# Patient Record
Sex: FEMALE | Race: WHITE | NOT HISPANIC OR LATINO | Employment: UNEMPLOYED | URBAN - METROPOLITAN AREA
[De-identification: names, ages, dates, MRNs, and addresses within clinical notes are randomized per-mention and may not be internally consistent; named-entity substitution may affect disease eponyms.]

---

## 2018-07-30 ENCOUNTER — OFFICE VISIT - HEALTHEAST (OUTPATIENT)
Dept: FAMILY MEDICINE | Facility: CLINIC | Age: 33
End: 2018-07-30

## 2018-07-30 DIAGNOSIS — F15.10 METHAMPHETAMINE ABUSE (H): ICD-10-CM

## 2018-07-30 DIAGNOSIS — I95.9 LOW BLOOD PRESSURE: ICD-10-CM

## 2018-07-30 DIAGNOSIS — D17.30 LIPOMA OF SKIN AND SUBCUTANEOUS TISSUE: ICD-10-CM

## 2018-07-30 DIAGNOSIS — R53.83 FATIGUE: ICD-10-CM

## 2018-07-30 LAB
ALBUMIN SERPL-MCNC: 4 G/DL (ref 3.5–5)
ALP SERPL-CCNC: 52 U/L (ref 45–120)
ALT SERPL W P-5'-P-CCNC: 12 U/L (ref 0–45)
ANION GAP SERPL CALCULATED.3IONS-SCNC: 7 MMOL/L (ref 5–18)
AST SERPL W P-5'-P-CCNC: 16 U/L (ref 0–40)
BILIRUB SERPL-MCNC: 0.6 MG/DL (ref 0–1)
BUN SERPL-MCNC: 12 MG/DL (ref 8–22)
CALCIUM SERPL-MCNC: 9.3 MG/DL (ref 8.5–10.5)
CHLORIDE BLD-SCNC: 105 MMOL/L (ref 98–107)
CO2 SERPL-SCNC: 27 MMOL/L (ref 22–31)
CORTIS SERPL-MCNC: 5.2 UG/DL
CREAT SERPL-MCNC: 0.72 MG/DL (ref 0.6–1.1)
ERYTHROCYTE [DISTWIDTH] IN BLOOD BY AUTOMATED COUNT: 13.3 % (ref 11–14.5)
ERYTHROCYTE [SEDIMENTATION RATE] IN BLOOD BY WESTERGREN METHOD: 5 MM/HR (ref 0–20)
GFR SERPL CREATININE-BSD FRML MDRD: >60 ML/MIN/1.73M2
GLUCOSE BLD-MCNC: 73 MG/DL (ref 70–125)
HCT VFR BLD AUTO: 38 % (ref 35–47)
HGB BLD-MCNC: 13.1 G/DL (ref 12–16)
MCH RBC QN AUTO: 29.8 PG (ref 27–34)
MCHC RBC AUTO-ENTMCNC: 34.4 G/DL (ref 32–36)
MCV RBC AUTO: 87 FL (ref 80–100)
PLATELET # BLD AUTO: 285 THOU/UL (ref 140–440)
PMV BLD AUTO: 7.3 FL (ref 7–10)
POTASSIUM BLD-SCNC: 4.1 MMOL/L (ref 3.5–5)
PROT SERPL-MCNC: 6.5 G/DL (ref 6–8)
RBC # BLD AUTO: 4.38 MILL/UL (ref 3.8–5.4)
SODIUM SERPL-SCNC: 139 MMOL/L (ref 136–145)
TSH SERPL DL<=0.005 MIU/L-ACNC: 1.13 UIU/ML (ref 0.3–5)
WBC: 5.9 THOU/UL (ref 4–11)

## 2018-07-30 ASSESSMENT — MIFFLIN-ST. JEOR: SCORE: 1205.71

## 2018-08-01 ENCOUNTER — COMMUNICATION - HEALTHEAST (OUTPATIENT)
Dept: FAMILY MEDICINE | Facility: CLINIC | Age: 33
End: 2018-08-01

## 2021-05-26 ENCOUNTER — RECORDS - HEALTHEAST (OUTPATIENT)
Dept: ADMINISTRATIVE | Facility: CLINIC | Age: 36
End: 2021-05-26

## 2021-05-27 ENCOUNTER — RECORDS - HEALTHEAST (OUTPATIENT)
Dept: ADMINISTRATIVE | Facility: CLINIC | Age: 36
End: 2021-05-27

## 2021-05-28 ENCOUNTER — RECORDS - HEALTHEAST (OUTPATIENT)
Dept: ADMINISTRATIVE | Facility: CLINIC | Age: 36
End: 2021-05-28

## 2021-05-29 ENCOUNTER — RECORDS - HEALTHEAST (OUTPATIENT)
Dept: ADMINISTRATIVE | Facility: CLINIC | Age: 36
End: 2021-05-29

## 2021-06-01 VITALS — BODY MASS INDEX: 19.2 KG/M2 | HEIGHT: 65 IN | WEIGHT: 115.25 LBS

## 2021-06-19 NOTE — PROGRESS NOTES
Right arm lump  Super tired  Low bp    2 months tired.    Using drugs.  Clean 10 months.  Wt up 17 pounds.  Tired   No fever, no rash, nl stool urine,  No palpitations.   No shortness of breath  occ feel warmer.   ? Both ways    Neg fmh thyroid.    Eat meat.    Worried.    ROS: as noted above    OBJECTIVE:   Vitals:    07/30/18 1607   BP: (!) 84/50   Pulse:    Resp:    Temp:       Wt is noted.  No diaphoresis  Eyes: nl eom, anicteric   External ears, nose: nl    Neck: nl nodes, supple, thyroid normal     Right epitrochlear soft mobile flat lipoma consistency  Lungs: clear to ausc   Heart: regular rhythm  Abd: soft nontender     No cva (renal) tenderness  Neuro: no weakness  Skin no rash  Joints: uninflamed   No ketotic breath odor noted  Mental: euthymic  Ext: nontender calves   Gait: normal    Body mass index is 19.48 kg/(m^2).     ASSESSMENT/PLAN:    If neg labs, future am cortisol    follow up per labs or sx persistence pcp    1. Fatigue  HM2(CBC w/o Differential)    Comprehensive Metabolic Panel    Thyroid Stimulating Hormone (TSH)    Erythrocyte Sedimentation Rate    Cortisol    Cortisol   2. Lipoma of skin and subcutaneous tissue     3. Low blood pressure     4. Methamphetamine abuse

## 2021-09-22 ENCOUNTER — HOSPITAL ENCOUNTER (EMERGENCY)
Facility: CLINIC | Age: 36
Discharge: HOME | End: 2021-09-22
Attending: EMERGENCY MEDICINE | Admitting: EMERGENCY MEDICINE

## 2021-09-22 VITALS
TEMPERATURE: 97.7 F | WEIGHT: 110 LBS | DIASTOLIC BLOOD PRESSURE: 70 MMHG | OXYGEN SATURATION: 100 % | BODY MASS INDEX: 18.33 KG/M2 | HEART RATE: 73 BPM | SYSTOLIC BLOOD PRESSURE: 103 MMHG | HEIGHT: 65 IN | RESPIRATION RATE: 18 BRPM

## 2021-09-22 DIAGNOSIS — Z20.822 ENCOUNTER FOR LABORATORY TESTING FOR COVID-19 VIRUS: ICD-10-CM

## 2021-09-22 DIAGNOSIS — J00 ACUTE NASOPHARYNGITIS: ICD-10-CM

## 2021-09-22 PROBLEM — F15.10 METHAMPHETAMINE ABUSE (H): Status: ACTIVE | Noted: 2021-09-22

## 2021-09-22 LAB
DEPRECATED S PYO AG THROAT QL EIA: NEGATIVE
GROUP A STREP BY PCR: NOT DETECTED
SARS-COV-2 RNA RESP QL NAA+PROBE: NEGATIVE

## 2021-09-22 PROCEDURE — 87635 SARS-COV-2 COVID-19 AMP PRB: CPT | Performed by: EMERGENCY MEDICINE

## 2021-09-22 PROCEDURE — C9803 HOPD COVID-19 SPEC COLLECT: HCPCS

## 2021-09-22 PROCEDURE — 99283 EMERGENCY DEPT VISIT LOW MDM: CPT

## 2021-09-22 PROCEDURE — 87651 STREP A DNA AMP PROBE: CPT | Performed by: EMERGENCY MEDICINE

## 2021-09-22 ASSESSMENT — ENCOUNTER SYMPTOMS
DYSURIA: 0
NAUSEA: 0
NECK PAIN: 1
RHINORRHEA: 1
SORE THROAT: 1
MYALGIAS: 1
SLEEP DISTURBANCE: 1
DIARRHEA: 0
VOMITING: 0

## 2021-09-22 ASSESSMENT — MIFFLIN-ST. JEOR: SCORE: 1184.84

## 2021-09-22 NOTE — ED PROVIDER NOTES
EMERGENCY DEPARTMENT ENCOUNTER      NAME: Morelia Lemon  AGE: 36 year old female  YOB: 1985  MRN: [id number]  EVALUATION DATE & TIME: 2021 11:48 AM    PCP: No primary care provider on file.    ED PROVIDER: Amena Donovan M.D.      CHIEF COMPLAINT     Chief Complaint   Patient presents with     Covid Concern         FINAL IMPRESSION:     1. Encounter for laboratory testing for COVID-19 virus    2. Acute nasopharyngitis          MEDICAL DECISION MAKING:       Pertinent Labs & Imaging studies reviewed. (See chart for details)    36 year old female presents to the Emergency Department for evaluation of throat runny nose body aches subjective fever.    ED Course as of Sep 22 1548   Wed Sep 22, 2021   1545 Patient is here complaining of sore throat runny nose body aches.  She was a  yesterday and is concerned about Covid.  She is not vaccinated.      1545 Denies chest pain shortness of breath abdominal pain possibility of pain in the neck see no dysuria hematuria leg swelling or rashes.      1545 No one has told her that they tested positive for Covid she just wants to be tested and also has a sore throat.      1545 Exam she is nontoxic cooperative and pleasant on respiratory distress.      1546 Rhinorrhea, mild erythema but no evidence of tonsillar abscess no petechia no purpura moist mucous membranes neck is supple no lymphadenopathy.      1546 Covid negative.  Rapid strep negative.  Discussed with patient given that this is the first day of her symptoms she still need to quarantine and get tested again and always wear her mask.  Encouraged her to follow-up primary care doctor and return for any concerns.      1546 Felt comfortable to plan discharge ambulatory stable condition.            Differential Diagnosis (include but not limited to)  Jose of strep pharyngitis or other viral respiratory infection among others      Vital Signs: Reviewed  EKG: none  Imaging: none  Home Meds: reviewed  ED  meds/abx: none  Fluids: none    Labs  Covid negative  Strep negative        Review of Previous Records  Per chart review, the patient was see at  ED on 7/13 for abdominal and back pain.  The patient was instructed take Mylanta and Pepcid as prescribed for the abdominal pain. For her back pain, she was instructed to take Tylenol, apply ice packs to the area (20 minutes on, 20 minutes off), and use lidocaine patches (available over-the-counter at pharmacies) if she felt they are helpful. She was instructed to take the prescribed antibiotics for UTI and completely finish the prescription.    Consults      ED COURSE     12:30 PM I met with the patient and performed my initial exam.  I discussed the plan for care and the patient is agreeable with this plan. PPE: Provider wore gloves, N95 mask, eye protection, surgical cap, and paper mask.     I discussed the plan for discharge with the patient, and patient is agreeable. We discussed supportivecares at home and reasons for return to the ER including new or worsening symptoms - all questions and concerns addressed. Patient to be discharged by RN.      At the conclusion of the encounter I discussed the results of all of the tests and the disposition. The questions were answered. The patient acknowledged understanding and was agreeable with the care plan.           MEDICATIONS GIVEN IN THE EMERGENCY:   Medications - No data to display    NEW PRESCRIPTIONS STARTED AT TODAY'S ER VISIT     Discharge Medication List as of 9/22/2021 12:59 PM          =================================================================    HPI     Patient information was obtained from: the patient    Use of : N/A    Morelia Lemon is a 36 year old year old female with a pertinent medical history of UTI, methamphetamine use who presents to the ED by personal vehicle for the evaluation of COVID-19 concern.    Per chart review, the patient was see at  ED on 7/13 for abdominal and back  pain.      The patient reports she went to a  yesterday with no known COVID-19 contacts.  However, last night she began to have a sore throat, rhinorrhea, myalgias, neck pain, and had difficulty sleeping last night.  The patient is otherwise healthy.  The patient does not believe she is pregnant.     The patient denies nausea, vomiting, diarrhea, dysuria, and any other symptoms or complaints at this time.    REVIEW OF SYSTEMS   Review of Systems   HENT: Positive for rhinorrhea and sore throat.    Gastrointestinal: Negative for diarrhea, nausea and vomiting.   Genitourinary: Negative for dysuria.   Musculoskeletal: Positive for myalgias and neck pain.   Psychiatric/Behavioral: Positive for sleep disturbance.   All other systems reviewed and are negative.       PAST MEDICAL HISTORY:   No past medical history on file.    PAST SURGICAL HISTORY:     Past Surgical History:   Procedure Laterality Date     DILATION AND CURETTAGE N/A 2014    Procedure: DILATION AND CURETTAGE SUCTION;  Surgeon: Janes Apple MD;  Location: [facility];  Service:          CURRENT MEDICATIONS:   oxyCODONE-acetaminophen (PERCOCET) 5-325 mg per tablet         ALLERGIES:     Allergies   Allergen Reactions     Blood-Group Specific Substance Other (See Comments)     Patient has probable passive anti-D. Blood product orders may be delayed. Draw one red top and two purple top tubes for all Type and Screen/Type and crossmatch orders.     Decadron [Dexamethasone] Itching       FAMILY HISTORY:   No family history on file.    SOCIAL HISTORY:     Social History     Socioeconomic History     Marital status: Single     Spouse name: Not on file     Number of children: Not on file     Years of education: Not on file     Highest education level: Not on file   Occupational History     Not on file   Tobacco Use     Smoking status: Current Every Day Smoker     Packs/day: 0.50     Smokeless tobacco: Current User     Types: Chew, Chew      "Tobacco comment: Pt refused cessation information   Substance and Sexual Activity     Alcohol use: Yes     Comment: Alcoholic Drinks/day: Rare use, only special occasions.     Drug use: No     Sexual activity: Not on file   Other Topics Concern     Not on file   Social History Narrative     Not on file     Social Determinants of Health     Financial Resource Strain:      Difficulty of Paying Living Expenses:    Food Insecurity:      Worried About Running Out of Food in the Last Year:      Ran Out of Food in the Last Year:    Transportation Needs:      Lack of Transportation (Medical):      Lack of Transportation (Non-Medical):    Physical Activity:      Days of Exercise per Week:      Minutes of Exercise per Session:    Stress:      Feeling of Stress :    Social Connections:      Frequency of Communication with Friends and Family:      Frequency of Social Gatherings with Friends and Family:      Attends [employer] Services:      Active Member of Clubs or Organizations:      Attends Club or Organization Meetings:      Marital Status:    Intimate Partner Violence:      Fear of Current or Ex-Partner:      Emotionally Abused:      Physically Abused:      Sexually Abused:        VITALS:   /70   Pulse 73   Temp 97.7  F (36.5  C) (Oral)   Resp 18   Ht 1.651 m (5' 5\")   Wt 49.9 kg (110 lb)   SpO2 100%   BMI 18.30 kg/m      PHYSICAL EXAM     Physical Exam  Vitals and nursing note reviewed.   Constitutional:       Appearance: Normal appearance.   HENT:      Right Ear: Ear canal normal.      Left Ear: Ear canal normal.      Nose: Congestion and rhinorrhea present.      Mouth/Throat:     Cardiovascular:      Rate and Rhythm: Normal rate.      Pulses: Normal pulses.   Pulmonary:      Effort: Pulmonary effort is normal.      Breath sounds: Normal breath sounds.   Musculoskeletal:         General: Normal range of motion.   Skin:     General: Skin is warm.      Capillary Refill: Capillary refill takes less than 2 " seconds.   Neurological:      General: No focal deficit present.      Mental Status: She is alert and oriented to person, place, and time.   Psychiatric:         Mood and Affect: Mood normal.         Behavior: Behavior normal.         Physical Exam   Constitutional: non Toxic well-appearing cooperative and pleasant with exam    Head: Atraumatic.     Nose: Nose normal.     Mouth/Throat: Oropharynx is clear and moist.     Eyes: EOM are normal. Pupils are equal, round, and reactive to light.     Ears: External ears normal.     Neck: Normal range of motion. Neck supple.     Cardiovascular: Normal rate, regular rhythm and normal heart sounds.      Pulmonary/Chest: Normal effort  and breath sounds normal.     Abdominal: Soft. Bowel sounds are normal.    Musculoskeletal: Normal range of motion. No edema.    Neurological: No focal deficits    Skin: Skin is warm and dry.     Psychiatric: Normal mood and affect. Behavior is normal.       LAB:     All pertinent labs reviewed and interpreted.  Labs Ordered and Resulted from Time of ED Arrival Up to the Time of Departure from the ED   COVID-19 VIRUS (CORONAVIRUS) BY PCR - Normal    Narrative:     Testing was performed using the chidi  SARS-CoV-2 & Influenza A/B Assay on the chidi  Melissa  System.  This test should be ordered for the detection of SARS-COV-2 in individuals who meet SARS-CoV-2 clinical and/or epidemiological criteria. Test performance is unknown in asymptomatic patients.  This test is for in vitro diagnostic use under the FDA EUA for laboratories certified under CLIA to perform moderate and/or high complexity testing. This test has not been FDA cleared or approved.  A negative test does not rule out the presence of PCR inhibitors in the specimen or target RNA in concentration below the limit of detection for the assay. The possibility of a false negative should be considered if the patient's recent exposure or clinical presentation suggests COVID-19.  [facility]  [facility] are certified under the Clinical Laboratory Improvement Amendments of 1988 (CLIA-88) as qualified to perform moderate and/or high complexity laboratory testing.   STREPTOCOCCUS A RAPID SCREEN W REFELX TO PCR - Normal   GROUP A STREPTOCOCCUS PCR THROAT SWAB        RADIOLOGY:     Reviewed all pertinent imaging. Please see official radiology report.  No orders to display        EKG:       I have independently reviewed and interpreted the EKG(s) documented above.      PROCEDURES:     Procedures      I, Caesar Zavala, am serving as a scribe to document services personally performed by Dr. Donovan based on my observation and the provider's statements to me. I, Amena Donovan MD attest that Caesar Zavala is acting in a scribe capacity, has observed my performance of the services and has documented them in accordance with my direction.    Amena Donovan M.D.  Emergency Medicine  [facility] EMERGENCY ROOM  [address] [facility] [id number]  [phone]  Dept: [phone]     Amena Donovan MD  09/22/21 5061

## 2021-09-22 NOTE — ED TRIAGE NOTES
Presents with complaint of sore throat, subjective fever, generalized body aches, intermittent abdominal pain, and congestion that onset yesterday. Has not been vaccinated for covid.

## 2021-09-22 NOTE — Clinical Note
Morelia Lemon was seen and treated in our emergency department on 9/22/2021.  She may return to work on 09/23/2021.  Patient was seen in the emergency department today.     If you have any questions or concerns, please don't hesitate to call.      Amena Donovan MD

## 2021-09-22 NOTE — DISCHARGE INSTRUCTIONS
Read and follow the discharge instructions.    Covid test was negative but you do have symptoms concerning for Covid therefore quarantine always wear a mask and get rechecked.    We will call you only if your strep is positive.    Stay well-hydrated take lots of liquids    Tylenol as needed for pain    Follow-up with your primary care doctor or the clinic doctor provided    Turn for any concerns

## 2022-09-04 ENCOUNTER — HOSPITAL ENCOUNTER (EMERGENCY)
Facility: HOSPITAL | Age: 37
Discharge: HOME | End: 2022-09-04
Attending: EMERGENCY MEDICINE | Admitting: EMERGENCY MEDICINE
Payer: MEDICAID

## 2022-09-04 VITALS
SYSTOLIC BLOOD PRESSURE: 129 MMHG | RESPIRATION RATE: 20 BRPM | OXYGEN SATURATION: 100 % | TEMPERATURE: 98.9 F | DIASTOLIC BLOOD PRESSURE: 72 MMHG | WEIGHT: 109 LBS | HEIGHT: 65 IN | HEART RATE: 87 BPM | BODY MASS INDEX: 18.16 KG/M2

## 2022-09-04 DIAGNOSIS — J02.9 ACUTE PHARYNGITIS, UNSPECIFIED ETIOLOGY: ICD-10-CM

## 2022-09-04 LAB
ALBUMIN UR-MCNC: NEGATIVE MG/DL
APPEARANCE UR: CLEAR
BILIRUB UR QL STRIP: NEGATIVE
COLOR UR AUTO: ABNORMAL
DEPRECATED S PYO AG THROAT QL EIA: NEGATIVE
GLUCOSE UR STRIP-MCNC: NEGATIVE MG/DL
HGB UR QL STRIP: ABNORMAL
KETONES UR STRIP-MCNC: NEGATIVE MG/DL
LEUKOCYTE ESTERASE UR QL STRIP: NEGATIVE
MUCOUS THREADS #/AREA URNS LPF: PRESENT /LPF
NITRATE UR QL: NEGATIVE
PH UR STRIP: 6 [PH] (ref 5–7)
RBC URINE: 1 /HPF
SP GR UR STRIP: 1.02 (ref 1–1.03)
SQUAMOUS EPITHELIAL: 3 /HPF
UROBILINOGEN UR STRIP-MCNC: <2 MG/DL
WBC URINE: 0 /HPF

## 2022-09-04 PROCEDURE — 87651 STREP A DNA AMP PROBE: CPT | Performed by: EMERGENCY MEDICINE

## 2022-09-04 PROCEDURE — 81001 URINALYSIS AUTO W/SCOPE: CPT | Performed by: EMERGENCY MEDICINE

## 2022-09-04 PROCEDURE — 99283 EMERGENCY DEPT VISIT LOW MDM: CPT

## 2022-09-04 ASSESSMENT — ACTIVITIES OF DAILY LIVING (ADL): ADLS_ACUITY_SCORE: 33

## 2022-09-04 NOTE — ED TRIAGE NOTES
"amb to triage.  Pt states havingswelling to R side of neck. \"I think its my lymph nodes\"  Reports fever yesterday but today woke up feeling fine. Last took tylenol yesterday. Denies  Other sx.      Triage Assessment     Row Name 09/04/22 1816       Triage Assessment (Adult)    Airway WDL WDL       Respiratory WDL    Respiratory WDL WDL       Skin Circulation/Temperature WDL    Skin Circulation/Temperature WDL WDL       Cardiac WDL    Cardiac WDL WDL       Peripheral/Neurovascular WDL    Peripheral Neurovascular WDL WDL       Cognitive/Neuro/Behavioral WDL    Cognitive/Neuro/Behavioral WDL WDL              "

## 2022-09-04 NOTE — ED PROVIDER NOTES
"EMERGENCY DEPARTMENT NOTE     Name: Morelia Lemon    Age/Sex: 37 year old female   MRN: [id number]   Evaluation Date & Time:  9/4/2022  6:19 PM    PCP:    No Ref-Primary, Physician   ED Provider: Jamil Garay D.O.       CHIEF COMPLAINT    Swelling and Fever       DIAGNOSIS & DISPOSITION     1. Acute pharyngitis, unspecified etiology      DISPOSITION: Home    At the conclusion of the encounter I discussed the results of all of the tests and the disposition. The questions were answered. The patient or family acknowledged understanding and was agreeable with the care plan.    TOTAL CRITICAL CARE TIME (EXCLUDING PROCEDURES): Not applicable    PROCEDURES:   None    EMERGENCY DEPARTMENT COURSE/MEDICAL DECISION MAKING   7:00 PM I met with the patient to gather history and to perform my initial exam.  We discussed treatment options and the plan for care while in the Emergency Department.    8:05 PM Patient to be discharged. She is agreeable with this plan.    Morelia Lemon is a 37 year old female without significant past medical history who presents to the emergency department for evaluation of her throat and right-sided neck swelling  Triage note reviewed:  amb to triage.  Pt states havingswelling to R side of neck. \"I think its my lymph nodes\"  Reports fever yesterday but today woke up feeling fine. Last took tylenol yesterday. Denies  Other sx.      Triage Assessment     Row Name 09/04/22 1816       Triage Assessment (Adult)    Airway WDL WDL       Respiratory WDL    Respiratory WDL WDL       Skin Circulation/Temperature WDL    Skin Circulation/Temperature WDL WDL       Cardiac WDL    Cardiac WDL WDL       Peripheral/Neurovascular WDL    Peripheral Neurovascular WDL WDL       Cognitive/Neuro/Behavioral WDL    Cognitive/Neuro/Behavioral WDL WDL                Differential  diagnosis  considered included but not limited to peritonsillar abscess, deep space infection of the neck, malignancy  Vital signs:/72   " "Pulse 87   Temp 98.9  F (37.2  C) (Temporal)   Resp 20   Ht 1.651 m (5' 5\")   Wt 49.4 kg (109 lb)   LMP 2022 (Approximate)   SpO2 100%   BMI 18.14 kg/m    Pertinent physical exam findings:  HEENT: Bilateral tonsillar enlargement without asymmetric swelling exudate.  On the right side of her neck she has a 2 cm well-circumscribed is mobile mass consistent with enlarged lymph node.  No submandibular or sublingual swelling.  Dentition appear to be normal  Diagnostic studies:  Imaging:  No orders to display      Lab:  Labs Ordered and Resulted from Time of ED Arrival to Time of ED Departure - No data to display   Interventions:  Medical decision makin:54 PM patient currently wishes to be discharged from the emergency department.  Rapid strep and urinalysis are pending and I am waiting call the patient with the results.  Patient has had 3-day history of sore throat and was concerned about swelling along the right side of her neck.  HEENT exam revealed bilateral tonsillar enlargement with symmetric without exudates and did not appear to have peritonsillar abscess.  Her dentition was normal without dental carry or gingival swelling.  There is no buccal cellulitis or submandibular swelling.  On her neck exam she had a discrete 1 to 2 cm rounded minimally tender mass on the right side of his neck which was consistent with an enlarged lymph node.  Patient had no other lymphadenopathy on exam.   I discussed that this may represent a response to a viral infection with the pharyngitis with the patient and with have checked a strep test which is pending.  Patient's only other complaint was intermittent kidney pain without specific urinary symptoms but after discussion also checked urinalysis.  Patient will be discharged with symptomatic care for pharyngitis with Tylenol ibuprofen for pain, salt water gargles.  I will call her if there appears to be strep or urinary tract infection will initiate " antibiotics.  Patient does not have a primary care clinic we will also give her referral to establish primary care.  Return criteria were discussed and if patient has progressive symptoms including increased swelling on the side of her neck or progressive sore throat with asymmetric swelling of her tonsillitis or significant dysphagia will return to the emergency department.  Note: Rapid strep was negative and urinalysis without evidence of infection  ED INTERVENTIONS   Medications - No data to display    DISCHARGE MEDICATIONS        Review of your medicines      UNREVIEWED medicines. Ask your doctor about these medicines      Dose / Directions   oxyCODONE-acetaminophen 5-325 MG tablet  Commonly known as: PERCOCET      [OXYCODONE-ACETAMINOPHEN (PERCOCET) 5-325 MG PER TABLET] 1-2 tabs by mouth every 4-6 hours as needed for pain  Quantity: 15 tablet  Refills: 0              INFORMATION SOURCE AND LIMITATIONS    History/Exam limitations: None  Patient information was obtained from: the patient  Use of : N/A    HISTORY OF PRESENT ILLNESS   Morelia Lemon is a 37 year old year old female with a relevant past history of depression, UTI, methamphetamine abuse, who presents to this ED for evaluation of swollen lymph nodes.    The patient describes a swelling on the right upper side of the neck for the past three days. The patient also reports an associated difficulty in swallowing due to pain. She reports that she had a fever one day, but that this has resolved. She denies swelling anywhere else on the body, cough, cold, stomach pain, diarrhea, and dysuria. The patient also reports a kidney pain, but thinks this is because she drinks a lot of alcohol, and reports this to be unrelated to her chief complaint. She denies any history of kidney problems.    REVIEW OF SYSTEMS:   Constitutional: Positive for fever (resolved).   HENT: Negative for URI symptoms or sore throat.  Positive for trouble swallowing and neck  "swelling.   Cardiac: Negative for chest pain, palpitations, near syncope or syncope  Respiratory: Negative for cough and shortness of breath.    Gastrointestinal: Negative for abdominal pain, nausea, vomiting, constipation, diarrhea, rectal bleeding or melena.  Genitourinary: Negative for dysuria, flank pain and hematuria. Positive for \"kidney pain\"  Musculoskeletal: Negative for back pain.   Skin: Negative for  rash  Neurological: Negative for dizziness, headache, syncope, speech difficulty, unilateral weakness or imbalance with walking.   Hematological: Negative for adenopathy. Does not bruise/bleed easily.   Psychiatric/Behavioral: Negative for confusion.       PATIENT HISTORY   No past medical history on file.  Patient Active Problem List   Diagnosis     Lower Back Pain     Dysfunctional Uterine Bleeding     Urinary Tract Infection     Depression     Amenorrhea     Methamphetamine Abuse     Pregnancy     Retained products of conception with hemorrhage     Methamphetamine abuse (H)     Vitamin D deficiency     Past Surgical History:   Procedure Laterality Date     DILATION AND CURETTAGE N/A 11/30/2014    Procedure: DILATION AND CURETTAGE SUCTION;  Surgeon: Janes Apple MD;  Location: [facility];  Service:      ONTRAPORT [facility]  Smoking: The patient smokes cigarettes regularly.   Alcohol Use: The patient reports drinking heavily.  Allergies   Allergen Reactions     Blood-Group Specific Substance Other (See Comments)     Patient has probable passive anti-D. Blood product orders may be delayed. Draw one red top and two purple top tubes for all Type and Screen/Type and crossmatch orders.     Decadron [Dexamethasone] Itching         OUTPATIENT MEDICATIONS     Discharge Medication List as of 9/4/2022  8:02 PM         Vitals:    09/04/22 1900 09/04/22 1915 09/04/22 1930 09/04/22 2006   BP: 118/77 112/79 (!) 108/92 129/72   Pulse: 89 86 89 87   Resp:    20   Temp:       TempSrc:       SpO2: 98% 99% 99% 100% "   Weight:       Height:           Physical Exam   Constitutional: Oriented to person, place, and time. Appears well-developed and well-nourished.   HEENT:    Bilateral tonsillar enlargement without asymmetric swelling exudate.  On the right side of her neck she has a 2 cm well-circumscribed is mobile mass consistent with enlarged lymph node.  No submandibular or sublingual swelling.  Dentition appear to be normal  Cardiovascular: Normal rate, regular rhythm and normal heart sounds.    Pulmonary/Chest: Normal effort  and breath sounds normal.   Abdominal: Soft. Bowel sounds are normal.   Musculoskeletal: Normal range of motion.   Neurological: Alert and oriented to person, place, and time. Normal strength.No sensory deficit. No cranial nerve deficit . Skin: Skin is warm and dry.   Psychiatric: Normal mood and affect. Behavior is normal. Thought content normal.       DIAGNOSTICS    LABORATORY FINDINGS (REVIEWED AND INTERPRETED):  Labs Ordered and Resulted from Time of ED Arrival to Time of ED Departure - No data to display      IMAGING (REVIEWED AND INTERPRETED):  No orders to display           I, Henrietta Samuel, am serving as a scribe to document services personally performed by Jamil Garay D.O., based on my observation and the provider s statements to me.    IJamil D.O., attest that Henrietta Samuel is acting in a scribe capacity, has observed my performance of the services and has documented them in accordance with my direction.    Jamil Garay D.O.  EMERGENCY MEDICINE   09/04/22  [facility] EMERGENCY DEPARTMENT  [address] [place] [id number]  [phone]  Dept: [phone]     Jamil Garay DO  09/06/22 0214

## 2022-09-05 LAB — GROUP A STREP BY PCR: DETECTED

## 2022-09-05 NOTE — DISCHARGE INSTRUCTIONS
Take ibuprofen 600 mg every 8 hours and Tylenol 1 g every 8 hours for pain management.  Gargle with salt water and maintain hydration by drinking a liter per day.  If worsening sore throat, you have swelling of one tonsil greater than the other, increased neck swelling or recurrent fever return to the emergency department.  You have been given referral to primary care to follow-up within 1 week and will receive a call to schedule.

## 2022-10-18 ENCOUNTER — TELEPHONE (OUTPATIENT)
Dept: FAMILY MEDICINE | Facility: CLINIC | Age: 37
End: 2022-10-18

## 2022-10-18 NOTE — TELEPHONE ENCOUNTER
Reason for call:  Other   Patient called regarding (reason for call): appointment  Additional comments:   Patient requesting lump on RT elbow, cannot do anything with RT arm, cannot lift and it is painful.   Patient seeking any avail provider [place], [facility], [facility].  She is hoping for an appt between 10am-230 if possible.    Phone number to reach patient:  Other phone number:    [phone] Carmenza Whyte      Best Time:  any    Can we leave a detailed message on this number?  YES    Travel screening: Not Applicable

## 2022-10-19 NOTE — TELEPHONE ENCOUNTER
Called patient to follow-up with phone call from yesterday.  States that she has been having ongoing right arm pain for 3 weeks. Advised patient to go to [facility] for evaluation due to no openings in clinic this week & next week. Patient refused saying that she can wait to see a provider in clinic & rather not go to the [facility] since she has been able to bear the pain for a few weeks now. Transferred patient to central scheduling to assist with scheduling with other clinics.    Delfino Chance, RN, BSN  [facility]

## 2022-12-01 ENCOUNTER — OFFICE VISIT (OUTPATIENT)
Dept: FAMILY MEDICINE | Facility: CLINIC | Age: 37
End: 2022-12-01
Payer: COMMERCIAL

## 2022-12-01 VITALS
SYSTOLIC BLOOD PRESSURE: 130 MMHG | RESPIRATION RATE: 20 BRPM | OXYGEN SATURATION: 99 % | DIASTOLIC BLOOD PRESSURE: 80 MMHG | HEART RATE: 65 BPM | TEMPERATURE: 97.6 F | WEIGHT: 109.25 LBS | HEIGHT: 66 IN | BODY MASS INDEX: 17.56 KG/M2

## 2022-12-01 DIAGNOSIS — N63.21 MASS OF UPPER OUTER QUADRANT OF LEFT BREAST: ICD-10-CM

## 2022-12-01 DIAGNOSIS — Z98.51 HISTORY OF TUBAL LIGATION: ICD-10-CM

## 2022-12-01 DIAGNOSIS — M77.11 RIGHT LATERAL EPICONDYLITIS: ICD-10-CM

## 2022-12-01 DIAGNOSIS — F41.9 ANXIETY: Primary | ICD-10-CM

## 2022-12-01 PROCEDURE — 99204 OFFICE O/P NEW MOD 45 MIN: CPT | Performed by: FAMILY MEDICINE

## 2022-12-01 RX ORDER — ESCITALOPRAM OXALATE 10 MG/1
10 TABLET ORAL DAILY
Qty: 30 TABLET | Refills: 11 | Status: SHIPPED | OUTPATIENT
Start: 2022-12-01 | End: 2024-06-29

## 2022-12-01 RX ORDER — NAPROXEN 500 MG/1
500 TABLET ORAL 2 TIMES DAILY WITH MEALS
Qty: 60 TABLET | Refills: 1 | Status: SHIPPED | OUTPATIENT
Start: 2022-12-01 | End: 2024-06-29

## 2022-12-01 NOTE — PROGRESS NOTES
ASSESMENT AND PLAN:  Diagnoses and all orders for this visit:  Anxiety  Counseling done today with the patient in detail.  We discussed formal counseling and psychotherapy, patient declines that.  We discussed healthy lifestyle issues and interventions that she can make to help and she will work on this, we discussed reduction or cessation of alcohol intake.  We reviewed medications and she would like to proceed with medication as prescribed  below, follow-up with me in the clinic in 6 weeks for routine recheck, sooner if problems.  -     escitalopram (LEXAPRO) 10 MG tablet; Take 1 tablet (10 mg) by mouth daily  History of tubal ligation  Updated chart.  Right lateral epicondylitis  Patient will get a tennis elbow strap brace and start using that, naproxen as prescribed below, if in her 6-week follow-up as detailed above she is still having significant problems at that time would consider steroid injection.  -     naproxen (NAPROSYN) 500 MG tablet; Take 1 tablet (500 mg) by mouth 2 times daily (with meals)  Mass of upper outer quadrant of left breast  On exam it feels like just a prominent area of tender breast tissue but will need imaging to further evaluate.  Patient in agreement with the plan.  -     MA Diagnostic Digital Bilateral; Future  -     US Breast Left Limited 1-3 Quadrants; Future      Reviewed the risks and benefits of the treatment plan with the patient and/or caregiver and we discussed indications for routine and emergent follow-up.        SUBJECTIVE: 37-year-old female new patient to me, I have been the physician for some of her family members.  Patient has several concerns.  Her chief concern is that about 4 to 5 weeks ago she noticed a area of mild pain and tenderness in her left lateral breast/axilla area.  When she feels the area she feels what feels like a lump there.  She thinks it may have increased in size over this past month.    Patient also reports moderate pain in the right elbow,  "sometimes severe.  Worsens with some movements of the arm and hand.  She has not been taking any medication for her, she has tolerated NSAIDs well in the past.  She is left-handed.  Patient is not currently employed and cannot think of any repetitive or heavy activities with the hand and arm that she has been involved in recently.    Patient also reports that she has been struggling with increasing anxiety over this past year.  She feels like multiple times per day she feels overwhelmed by anxiety.  Sometimes this feeling is associated with a tightness in her breathing that lasts for several minutes.  She has been self-medicating with 3 shots of alcohol per day on most days.  Patient has remote history of methamphetamine abuse but has not used methamphetamine at all recently.        No past medical history on file.  Patient Active Problem List   Diagnosis     Lower Back Pain     Dysfunctional Uterine Bleeding     Urinary Tract Infection     Depression     Amenorrhea     Methamphetamine Abuse     Pregnancy     Retained products of conception with hemorrhage     Methamphetamine abuse (H)     Vitamin D deficiency     History of tubal ligation     Current Outpatient Medications   Medication Sig Dispense Refill     escitalopram (LEXAPRO) 10 MG tablet Take 1 tablet (10 mg) by mouth daily 30 tablet 11     naproxen (NAPROSYN) 500 MG tablet Take 1 tablet (500 mg) by mouth 2 times daily (with meals) 60 tablet 1     History   Smoking Status     Every Day     Packs/day: 0.50   Smokeless Tobacco     Current     Types: Chew, Chew     Comment: Pt refused cessation information       OBJECTICE: /80 (BP Location: Right arm, Patient Position: Sitting, Cuff Size: Adult Small)   Pulse 65   Temp 97.6  F (36.4  C) (Oral)   Resp 20   Ht 1.677 m (5' 6.04\")   Wt 49.6 kg (109 lb 4 oz)   LMP 11/30/2022 (Exact Date)   SpO2 99%   BMI 17.61 kg/m       No results found for this or any previous visit (from the past 24 " hour(s)).     GEN-alert, appropriate, in no apparent distress   Breast exam- done with Wendie present in the room for the exam- bilateral breast exam shows no dominant mass.  In her area of concern in the upper outer quadrant and moving into the axilla of the left breast there is an area that she identifies that is tender to palpation and per my exam feels like an area of prominent breast tissue.  It is of rubbery consistency and is mobile.   CV-regular rate and rhythm   RESP-lungs clear to auscultation   Musculoskeletal-she is point tender to palpation and it repeats her pain complaints with palpation of the right elbow lateral epicondyles and proximal tendon. SKIN-no abnormalities of the skin or nipple of either breast      Jose Glover MD

## 2023-03-07 ENCOUNTER — DOCUMENTATION ONLY (OUTPATIENT)
Dept: FAMILY MEDICINE | Facility: CLINIC | Age: 38
End: 2023-03-07
Payer: COMMERCIAL

## 2023-03-07 NOTE — PROGRESS NOTES
Unable to reach patient over the telephone, voice message left on the cell phone.  Letter sent:    March 7, 2023      Morelia Lemon  [address] [place] [address]        Dear Morelia,     I noticed that you missed 2 appointments in January and I do not see that they have been rescheduled.  One appointment was for breast imaging which I think is very important that it be completed, please call the breast center or the [facility] number to reschedule this.  You also missed your follow-up appointment with me to follow-up on your anxiety and the medication that I prescribed.  Please schedule follow-up appoint with me at your earliest convenience or I am also open to just having a follow-up discussion over the telephone if you could give me a call here at the clinic, I was unable to reach you over the phone on several occasions.      Sincerely,        Jose Glover MD

## 2023-05-27 ENCOUNTER — HOSPITAL ENCOUNTER (EMERGENCY)
Facility: HOSPITAL | Age: 38
Discharge: HOME | End: 2023-05-27
Attending: EMERGENCY MEDICINE | Admitting: EMERGENCY MEDICINE
Payer: COMMERCIAL

## 2023-05-27 VITALS
DIASTOLIC BLOOD PRESSURE: 70 MMHG | BODY MASS INDEX: 17.73 KG/M2 | SYSTOLIC BLOOD PRESSURE: 109 MMHG | OXYGEN SATURATION: 98 % | HEART RATE: 108 BPM | TEMPERATURE: 98.4 F | WEIGHT: 110 LBS | RESPIRATION RATE: 17 BRPM

## 2023-05-27 DIAGNOSIS — J02.0 STREPTOCOCCAL SORE THROAT: ICD-10-CM

## 2023-05-27 LAB
FLUAV RNA SPEC QL NAA+PROBE: NEGATIVE
FLUBV RNA RESP QL NAA+PROBE: NEGATIVE
GROUP A STREP BY PCR: DETECTED
RSV RNA SPEC NAA+PROBE: NEGATIVE
SARS-COV-2 RNA RESP QL NAA+PROBE: NEGATIVE

## 2023-05-27 PROCEDURE — 250N000011 HC RX IP 250 OP 636: Performed by: EMERGENCY MEDICINE

## 2023-05-27 PROCEDURE — 87637 SARSCOV2&INF A&B&RSV AMP PRB: CPT | Performed by: EMERGENCY MEDICINE

## 2023-05-27 PROCEDURE — C9803 HOPD COVID-19 SPEC COLLECT: HCPCS

## 2023-05-27 PROCEDURE — 250N000013 HC RX MED GY IP 250 OP 250 PS 637: Performed by: EMERGENCY MEDICINE

## 2023-05-27 PROCEDURE — 96372 THER/PROPH/DIAG INJ SC/IM: CPT | Performed by: EMERGENCY MEDICINE

## 2023-05-27 PROCEDURE — 99284 EMERGENCY DEPT VISIT MOD MDM: CPT

## 2023-05-27 PROCEDURE — 87651 STREP A DNA AMP PROBE: CPT | Performed by: EMERGENCY MEDICINE

## 2023-05-27 RX ORDER — IBUPROFEN 600 MG/1
600 TABLET, FILM COATED ORAL ONCE
Status: COMPLETED | OUTPATIENT
Start: 2023-05-27 | End: 2023-05-27

## 2023-05-27 RX ORDER — IBUPROFEN 800 MG/1
800 TABLET, FILM COATED ORAL EVERY 8 HOURS PRN
Qty: 24 TABLET | Refills: 0 | Status: SHIPPED | OUTPATIENT
Start: 2023-05-27 | End: 2023-06-04

## 2023-05-27 RX ADMIN — IBUPROFEN 600 MG: 600 TABLET, FILM COATED ORAL at 13:58

## 2023-05-27 RX ADMIN — PENICILLIN G BENZATHINE 1.2 MILLION UNITS: 1200000 INJECTION, SUSPENSION INTRAMUSCULAR at 15:05

## 2023-05-27 ASSESSMENT — ACTIVITIES OF DAILY LIVING (ADL): ADLS_ACUITY_SCORE: 35

## 2023-05-27 NOTE — ED PROVIDER NOTES
EMERGENCY DEPARTMENT ENCOUNTER      NAME: Morelia Lemon  AGE: 37 year old female  YOB: 1985  MRN: [id number]  EVALUATION DATE & TIME: 5/27/2023  1:49 PM    PCP: Jose Glover    ED PROVIDER: Alysia Looney M.D.      Chief Complaint   Patient presents with     Otalgia     Throat Pain         FINAL IMPRESSION:  1. Streptococcal sore throat          ED COURSE & MEDICAL DECISION MAKING:    ED Course as of 05/27/23 1514   Sat May 27, 2023   1354 I met the patient and performed my initial interview and exam.      1357 Pt with gradual onset 12 hours right ear pressure and sore throat, no medications taken yet, given ibuprofen here in ED and COVID19/influenza PCR and strep PCR sent from the ED with reassuring examination.    1451 Pt strep + thus will offer IM benzathine PCN vs. Oral antibiotic therapy with strep throat   1458 I spoke with patient and offered oral antibiotic therapy in the form of pill Rx vs. Single IM injection of bicillin, she requests bicillin injection and dose ordered here in ED with Rx to preferred pharmacy for NSAID therapy also. Patient discharged after being provided with extensive anticipatory guidance and given return precautions, importance of PMD follow-up emphasized.        Pertinent Labs & Imaging studies reviewed. (See chart for details)    N95 worn  A face shield was worn also  COVID PPE    Medical Decision Making    History:    Supplemental history from: Documented in chart, if applicable    External Record(s) reviewed: Documented in chart, if applicable.    Work Up:    Chart documentation includes differential considered and any EKGs or imaging independently interpreted by provider, where specified.    In additional to work up documented, I considered the following work up: Documented in chart, if applicable.    External consultation:    Discussion of management with another provider: Documented in chart, if applicable    Complicating factors:    Care impacted by chronic  illness: N/A    Care affected by social determinants of health: Alcohol Abuse and/or Recreational Drug Use    Disposition considerations: Discharge. I prescribed additional prescription strength medication(s) as charted. See documentation for any additional details.    At the conclusion of the encounter I discussed the results of all of the tests and the disposition. The questions were answered. The patient or family acknowledged understanding and was agreeable with the care plan.     MEDICATIONS GIVEN IN THE EMERGENCY:  Medications   ibuprofen (ADVIL/MOTRIN) tablet 600 mg (600 mg Oral $Given 5/27/23 6269)   penicillin G benzathine (BICILLIN L-A) injection 1.2 Million Units (1.2 Million Units Intramuscular $Given 5/27/23 1505)       NEW PRESCRIPTIONS STARTED AT TODAY'S ER VISIT  New Prescriptions    IBUPROFEN (ADVIL/MOTRIN) 800 MG TABLET    Take 1 tablet (800 mg) by mouth every 8 hours as needed for moderate pain          =================================================================    HPI      Morelia Lemon is a 37 year old female with PMHx of methamphetamine abuse who presents to the ED today via walk in with sore throat    Gradual onset since yesterday evening 8/10 constant sore throat with assoicated sence of lymph node enlargement and right ear ache. No cough. No known sick contacts. No fever. No medications prior to arrival. She is brought in with her mother.    REVIEW OF SYSTEMS   All other systems reviewed and are negative except as noted above in HPI.    PAST MEDICAL HISTORY:  History reviewed. No pertinent past medical history.    PAST SURGICAL HISTORY:  Past Surgical History:   Procedure Laterality Date     DILATION AND CURETTAGE N/A 11/30/2014    Procedure: DILATION AND CURETTAGE SUCTION;  Surgeon: Janes Apple MD;  Location: [facility];  Service:        CURRENT MEDICATIONS:    ibuprofen (ADVIL/MOTRIN) 800 MG tablet  escitalopram (LEXAPRO) 10 MG tablet  naproxen (NAPROSYN) 500 MG  tablet        ALLERGIES:  Allergies   Allergen Reactions     Blood-Group Specific Substance Other (See Comments)     Patient has probable passive anti-D. Blood product orders may be delayed. Draw one red top and two purple top tubes for all Type and Screen/Type and crossmatch orders.     Decadron [Dexamethasone] Itching       FAMILY HISTORY:  History reviewed. No pertinent family history.    SOCIAL HISTORY:   Social History     Socioeconomic History     Marital status: Single   Tobacco Use     Smoking status: Every Day     Packs/day: 0.50     Types: Cigarettes     Smokeless tobacco: Current     Types: Chew     Tobacco comments:     Pt refused cessation information   Substance and Sexual Activity     Alcohol use: Yes     Comment: Alcoholic Drinks/day: Rare use, only special occasions.     Drug use: No       VITALS:  Patient Vitals for the past 24 hrs:   BP Temp Temp src Pulse Resp SpO2 Weight   05/27/23 1346 109/70 98.4  F (36.9  C) Oral 108 17 98 % 49.9 kg (110 lb)       PHYSICAL EXAM    GENERAL: Awake, alert.  In no acute distress.   HEENT: Normocephalic, atraumatic.  Pupils equal, round and reactive.  Conjunctiva normal.  EOMI. Bilateral TMs without effusion, redness, or laying fluid, Oral pharynx normal, voice normal, swallowing saliva  NECK: No stridor or apparent deformity.  PULMONARY: Symmetrical breath sounds without distress.  Lungs clear to auscultation bilaterally without wheezes, rhonchi or rales.  CARDIO: Regular rate and rhythm.  No significant murmur, rub or gallop.  Radial pulses strong and symmetrical.  ABDOMINAL: Abdomen soft, non-distended and non-tender to palpation.  No CVAT, no palpable hepatosplenomegaly.  EXTREMITIES: No lower extremity swelling or edema.    NEURO: Alert and oriented to person, place and time.  Cranial nerves grossly intact.  No focal motor deficit.  PSYCH: Normal mood and affect  SKIN: No rashes      LAB:  All pertinent labs reviewed and interpreted.  Results for orders  placed or performed during the hospital encounter of 05/27/23   Symptomatic Influenza A/B, RSV, & SARS-CoV2 PCR (COVID-19) Nasopharyngeal    Specimen: Nasopharyngeal; Swab   Result Value Ref Range    Influenza A PCR Negative Negative    Influenza B PCR Negative Negative    RSV PCR Negative Negative    SARS CoV2 PCR Negative Negative   Group A Streptococcus PCR Throat Swab    Specimen: Throat; Swab   Result Value Ref Range    Group A strep by PCR Detected (A) Not Detected               I, Pat Kietsheila, am serving as a scribe to document services personally performed by Dr. Alysia Looney based on my observation and the provider's statements to me. I, Alysia Looney MD attest that Pat Sweet is acting in a scribe capacity, has observed my performance of the services and has documented them in accordance with my direction.     Alysia Looney MD  05/27/23 1515

## 2023-05-27 NOTE — ED NOTES
Pt states her throat started to hurt last night. No meds taken today tp help with the pain. Rates throat pain 8/10.

## 2023-05-27 NOTE — ED TRIAGE NOTES
Pt presents to triage ambulatory for sore throat. Pt reports swollen lymph node on R side, sore throat, and R ear pain started yesterday. Has not been taking her temp but reports chills.

## 2023-05-27 NOTE — Clinical Note
Morelia Lemon was seen and treated in our emergency department on 5/27/2023.  She may return to work on 05/28/2023.       If you have any questions or concerns, please don't hesitate to call.      Alysia Looney MD

## 2023-10-03 ENCOUNTER — TELEPHONE (OUTPATIENT)
Dept: FAMILY MEDICINE | Facility: CLINIC | Age: 38
End: 2023-10-03

## 2023-10-03 NOTE — TELEPHONE ENCOUNTER
Patient Quality Outreach    Patient is due for the following:   Cervical Cancer Screening - PAP Needed    Next Steps:   Schedule a Adult Preventative    Type of outreach:    Phone, VM is not set up.       Questions for provider review:    None           Carmela Long MA

## 2023-10-16 NOTE — TELEPHONE ENCOUNTER
Patient Quality Outreach    Patient is due for the following:   Cervical Cancer Screening - PAP Needed  Physical Preventive Adult Physical    Next Steps:   Schedule a Adult Preventative    Type of outreach:    Phone, VM not set up.       Questions for provider review:    None           Carmela Long MA

## 2023-11-15 NOTE — TELEPHONE ENCOUNTER
Patient Quality Outreach    Patient is due for the following:   Cervical Cancer Screening - PAP Needed  Physical Preventive Adult Physical    Next Steps:   Schedule a Adult Preventative    Type of outreach:    Phone, not available     Next Steps:  Reach out within 90 days via Phone.    Max number of attempts reached: Yes. Will try again in 90 days if patient still on fail list.    Questions for provider review:    None           Carmela Long MA

## 2024-06-29 ENCOUNTER — HOSPITAL ENCOUNTER (INPATIENT)
Facility: HOSPITAL | Age: 39
LOS: 1 days | Discharge: LEFT BEFORE DISCHARGE | End: 2024-06-29
Attending: EMERGENCY MEDICINE | Admitting: FAMILY MEDICINE
Payer: MEDICAID

## 2024-06-29 VITALS
HEIGHT: 65 IN | DIASTOLIC BLOOD PRESSURE: 55 MMHG | RESPIRATION RATE: 20 BRPM | OXYGEN SATURATION: 98 % | TEMPERATURE: 97.7 F | SYSTOLIC BLOOD PRESSURE: 90 MMHG | BODY MASS INDEX: 19.36 KG/M2 | HEART RATE: 66 BPM | WEIGHT: 116.2 LBS

## 2024-06-29 DIAGNOSIS — R11.0 NAUSEA: ICD-10-CM

## 2024-06-29 DIAGNOSIS — R50.9 FEVER, UNSPECIFIED FEVER CAUSE: ICD-10-CM

## 2024-06-29 DIAGNOSIS — R51.9 ACUTE NONINTRACTABLE HEADACHE, UNSPECIFIED HEADACHE TYPE: ICD-10-CM

## 2024-06-29 DIAGNOSIS — R52 BODY ACHES: ICD-10-CM

## 2024-06-29 DIAGNOSIS — N12 PYELONEPHRITIS: ICD-10-CM

## 2024-06-29 LAB
ALBUMIN UR-MCNC: NEGATIVE MG/DL
ANION GAP SERPL CALCULATED.3IONS-SCNC: 10 MMOL/L (ref 7–15)
ANION GAP SERPL CALCULATED.3IONS-SCNC: 11 MMOL/L (ref 7–15)
APPEARANCE UR: ABNORMAL
BACTERIA #/AREA URNS HPF: ABNORMAL /HPF
BASOPHILS # BLD AUTO: 0 10E3/UL (ref 0–0.2)
BASOPHILS NFR BLD AUTO: 0 %
BILIRUB UR QL STRIP: NEGATIVE
BUN SERPL-MCNC: 7.1 MG/DL (ref 6–20)
BUN SERPL-MCNC: 7.3 MG/DL (ref 6–20)
CALCIUM SERPL-MCNC: 8.1 MG/DL (ref 8.6–10)
CALCIUM SERPL-MCNC: 8.7 MG/DL (ref 8.6–10)
CHLORIDE SERPL-SCNC: 105 MMOL/L (ref 98–107)
CHLORIDE SERPL-SCNC: 99 MMOL/L (ref 98–107)
COLOR UR AUTO: ABNORMAL
CREAT SERPL-MCNC: 0.59 MG/DL (ref 0.51–0.95)
CREAT SERPL-MCNC: 0.66 MG/DL (ref 0.51–0.95)
DEPRECATED HCO3 PLAS-SCNC: 22 MMOL/L (ref 22–29)
DEPRECATED HCO3 PLAS-SCNC: 22 MMOL/L (ref 22–29)
EGFRCR SERPLBLD CKD-EPI 2021: >90 ML/MIN/1.73M2
EGFRCR SERPLBLD CKD-EPI 2021: >90 ML/MIN/1.73M2
EOSINOPHIL # BLD AUTO: 0 10E3/UL (ref 0–0.7)
EOSINOPHIL NFR BLD AUTO: 0 %
ERYTHROCYTE [DISTWIDTH] IN BLOOD BY AUTOMATED COUNT: 12.8 % (ref 10–15)
ERYTHROCYTE [DISTWIDTH] IN BLOOD BY AUTOMATED COUNT: 13 % (ref 10–15)
GLUCOSE SERPL-MCNC: 109 MG/DL (ref 70–99)
GLUCOSE SERPL-MCNC: 89 MG/DL (ref 70–99)
GLUCOSE UR STRIP-MCNC: NEGATIVE MG/DL
HCT VFR BLD AUTO: 33 % (ref 35–47)
HCT VFR BLD AUTO: 35.7 % (ref 35–47)
HGB BLD-MCNC: 11.1 G/DL (ref 11.7–15.7)
HGB BLD-MCNC: 12.4 G/DL (ref 11.7–15.7)
HGB UR QL STRIP: ABNORMAL
IMM GRANULOCYTES # BLD: 0.1 10E3/UL
IMM GRANULOCYTES NFR BLD: 1 %
KETONES UR STRIP-MCNC: ABNORMAL MG/DL
LACTATE SERPL-SCNC: 0.5 MMOL/L (ref 0.7–2)
LEUKOCYTE ESTERASE UR QL STRIP: ABNORMAL
LYMPHOCYTES # BLD AUTO: 0.8 10E3/UL (ref 0.8–5.3)
LYMPHOCYTES NFR BLD AUTO: 7 %
MAGNESIUM SERPL-MCNC: 1.8 MG/DL (ref 1.7–2.3)
MAGNESIUM SERPL-MCNC: 1.8 MG/DL (ref 1.7–2.3)
MCH RBC QN AUTO: 30.1 PG (ref 26.5–33)
MCH RBC QN AUTO: 30.2 PG (ref 26.5–33)
MCHC RBC AUTO-ENTMCNC: 33.6 G/DL (ref 31.5–36.5)
MCHC RBC AUTO-ENTMCNC: 34.7 G/DL (ref 31.5–36.5)
MCV RBC AUTO: 87 FL (ref 78–100)
MCV RBC AUTO: 90 FL (ref 78–100)
MONOCYTES # BLD AUTO: 1 10E3/UL (ref 0–1.3)
MONOCYTES NFR BLD AUTO: 10 %
NEUTROPHILS # BLD AUTO: 8.5 10E3/UL (ref 1.6–8.3)
NEUTROPHILS NFR BLD AUTO: 82 %
NITRATE UR QL: NEGATIVE
NRBC # BLD AUTO: 0 10E3/UL
NRBC BLD AUTO-RTO: 0 /100
PH UR STRIP: 6 [PH] (ref 5–7)
PHOSPHATE SERPL-MCNC: 3 MG/DL (ref 2.5–4.5)
PLATELET # BLD AUTO: 214 10E3/UL (ref 150–450)
PLATELET # BLD AUTO: 244 10E3/UL (ref 150–450)
POTASSIUM SERPL-SCNC: 3.6 MMOL/L (ref 3.4–5.3)
POTASSIUM SERPL-SCNC: 3.7 MMOL/L (ref 3.4–5.3)
RBC # BLD AUTO: 3.68 10E6/UL (ref 3.8–5.2)
RBC # BLD AUTO: 4.12 10E6/UL (ref 3.8–5.2)
RBC URINE: 3 /HPF
SODIUM SERPL-SCNC: 132 MMOL/L (ref 135–145)
SODIUM SERPL-SCNC: 137 MMOL/L (ref 135–145)
SP GR UR STRIP: 1.01 (ref 1–1.03)
SQUAMOUS EPITHELIAL: 13 /HPF
UROBILINOGEN UR STRIP-MCNC: <2 MG/DL
WBC # BLD AUTO: 10.3 10E3/UL (ref 4–11)
WBC # BLD AUTO: 14.5 10E3/UL (ref 4–11)
WBC URINE: 16 /HPF
YEAST #/AREA URNS HPF: ABNORMAL /HPF

## 2024-06-29 PROCEDURE — 84100 ASSAY OF PHOSPHORUS: CPT | Performed by: HOSPITALIST

## 2024-06-29 PROCEDURE — HZ2ZZZZ DETOXIFICATION SERVICES FOR SUBSTANCE ABUSE TREATMENT: ICD-10-PCS | Performed by: HOSPITALIST

## 2024-06-29 PROCEDURE — 81001 URINALYSIS AUTO W/SCOPE: CPT | Performed by: EMERGENCY MEDICINE

## 2024-06-29 PROCEDURE — 96375 TX/PRO/DX INJ NEW DRUG ADDON: CPT

## 2024-06-29 PROCEDURE — 99222 1ST HOSP IP/OBS MODERATE 55: CPT | Performed by: FAMILY MEDICINE

## 2024-06-29 PROCEDURE — 120N000001 HC R&B MED SURG/OB

## 2024-06-29 PROCEDURE — 250N000013 HC RX MED GY IP 250 OP 250 PS 637: Performed by: FAMILY MEDICINE

## 2024-06-29 PROCEDURE — 250N000011 HC RX IP 250 OP 636: Performed by: EMERGENCY MEDICINE

## 2024-06-29 PROCEDURE — 80048 BASIC METABOLIC PNL TOTAL CA: CPT | Performed by: EMERGENCY MEDICINE

## 2024-06-29 PROCEDURE — 258N000003 HC RX IP 258 OP 636: Performed by: EMERGENCY MEDICINE

## 2024-06-29 PROCEDURE — 99285 EMERGENCY DEPT VISIT HI MDM: CPT | Mod: 25

## 2024-06-29 PROCEDURE — 83735 ASSAY OF MAGNESIUM: CPT | Performed by: EMERGENCY MEDICINE

## 2024-06-29 PROCEDURE — 87086 URINE CULTURE/COLONY COUNT: CPT | Performed by: EMERGENCY MEDICINE

## 2024-06-29 PROCEDURE — 258N000003 HC RX IP 258 OP 636: Performed by: FAMILY MEDICINE

## 2024-06-29 PROCEDURE — 85027 COMPLETE CBC AUTOMATED: CPT | Performed by: FAMILY MEDICINE

## 2024-06-29 PROCEDURE — 36415 COLL VENOUS BLD VENIPUNCTURE: CPT | Performed by: FAMILY MEDICINE

## 2024-06-29 PROCEDURE — 250N000013 HC RX MED GY IP 250 OP 250 PS 637: Performed by: HOSPITALIST

## 2024-06-29 PROCEDURE — 82374 ASSAY BLOOD CARBON DIOXIDE: CPT | Performed by: FAMILY MEDICINE

## 2024-06-29 PROCEDURE — 83605 ASSAY OF LACTIC ACID: CPT | Performed by: EMERGENCY MEDICINE

## 2024-06-29 PROCEDURE — 36415 COLL VENOUS BLD VENIPUNCTURE: CPT | Performed by: EMERGENCY MEDICINE

## 2024-06-29 PROCEDURE — 96365 THER/PROPH/DIAG IV INF INIT: CPT

## 2024-06-29 PROCEDURE — 83735 ASSAY OF MAGNESIUM: CPT | Performed by: HOSPITALIST

## 2024-06-29 PROCEDURE — 250N000011 HC RX IP 250 OP 636: Performed by: FAMILY MEDICINE

## 2024-06-29 PROCEDURE — 85025 COMPLETE CBC W/AUTO DIFF WBC: CPT | Performed by: EMERGENCY MEDICINE

## 2024-06-29 RX ORDER — MORPHINE SULFATE 2 MG/ML
2 INJECTION, SOLUTION INTRAMUSCULAR; INTRAVENOUS
Status: DISCONTINUED | OUTPATIENT
Start: 2024-06-29 | End: 2024-06-29 | Stop reason: HOSPADM

## 2024-06-29 RX ORDER — NALOXONE HYDROCHLORIDE 0.4 MG/ML
0.4 INJECTION, SOLUTION INTRAMUSCULAR; INTRAVENOUS; SUBCUTANEOUS
Status: DISCONTINUED | OUTPATIENT
Start: 2024-06-29 | End: 2024-06-29 | Stop reason: HOSPADM

## 2024-06-29 RX ORDER — MULTIPLE VITAMINS W/ MINERALS TAB 9MG-400MCG
1 TAB ORAL DAILY
Status: DISCONTINUED | OUTPATIENT
Start: 2024-06-29 | End: 2024-06-29 | Stop reason: HOSPADM

## 2024-06-29 RX ORDER — ONDANSETRON 2 MG/ML
4 INJECTION INTRAMUSCULAR; INTRAVENOUS ONCE
Status: COMPLETED | OUTPATIENT
Start: 2024-06-29 | End: 2024-06-29

## 2024-06-29 RX ORDER — SODIUM CHLORIDE 9 MG/ML
INJECTION, SOLUTION INTRAVENOUS CONTINUOUS
Status: ACTIVE | OUTPATIENT
Start: 2024-06-29 | End: 2024-06-29

## 2024-06-29 RX ORDER — OLANZAPINE 5 MG/1
5-10 TABLET, ORALLY DISINTEGRATING ORAL EVERY 6 HOURS PRN
Status: DISCONTINUED | OUTPATIENT
Start: 2024-06-29 | End: 2024-06-29 | Stop reason: HOSPADM

## 2024-06-29 RX ORDER — DIAZEPAM 10 MG
10 TABLET ORAL EVERY 30 MIN PRN
Status: DISCONTINUED | OUTPATIENT
Start: 2024-06-29 | End: 2024-06-29 | Stop reason: HOSPADM

## 2024-06-29 RX ORDER — MORPHINE SULFATE 2 MG/ML
2 INJECTION, SOLUTION INTRAMUSCULAR; INTRAVENOUS ONCE
Status: COMPLETED | OUTPATIENT
Start: 2024-06-29 | End: 2024-06-29

## 2024-06-29 RX ORDER — NALOXONE HYDROCHLORIDE 0.4 MG/ML
0.2 INJECTION, SOLUTION INTRAMUSCULAR; INTRAVENOUS; SUBCUTANEOUS
Status: DISCONTINUED | OUTPATIENT
Start: 2024-06-29 | End: 2024-06-29 | Stop reason: HOSPADM

## 2024-06-29 RX ORDER — CEFTRIAXONE 1 G/1
1 INJECTION, POWDER, FOR SOLUTION INTRAMUSCULAR; INTRAVENOUS ONCE
Status: COMPLETED | OUTPATIENT
Start: 2024-06-29 | End: 2024-06-29

## 2024-06-29 RX ORDER — DIAZEPAM 10 MG/2ML
5-10 INJECTION, SOLUTION INTRAMUSCULAR; INTRAVENOUS EVERY 30 MIN PRN
Status: DISCONTINUED | OUTPATIENT
Start: 2024-06-29 | End: 2024-06-29 | Stop reason: HOSPADM

## 2024-06-29 RX ORDER — HALOPERIDOL 5 MG/ML
2.5-5 INJECTION INTRAMUSCULAR EVERY 6 HOURS PRN
Status: DISCONTINUED | OUTPATIENT
Start: 2024-06-29 | End: 2024-06-29 | Stop reason: HOSPADM

## 2024-06-29 RX ORDER — KETOROLAC TROMETHAMINE 15 MG/ML
15 INJECTION, SOLUTION INTRAMUSCULAR; INTRAVENOUS EVERY 6 HOURS PRN
Status: DISCONTINUED | OUTPATIENT
Start: 2024-06-29 | End: 2024-06-29 | Stop reason: HOSPADM

## 2024-06-29 RX ORDER — CEFTRIAXONE 1 G/1
1 INJECTION, POWDER, FOR SOLUTION INTRAMUSCULAR; INTRAVENOUS EVERY 24 HOURS
Status: DISCONTINUED | OUTPATIENT
Start: 2024-06-30 | End: 2024-06-29 | Stop reason: HOSPADM

## 2024-06-29 RX ORDER — LIDOCAINE 40 MG/G
CREAM TOPICAL
Status: DISCONTINUED | OUTPATIENT
Start: 2024-06-29 | End: 2024-06-29 | Stop reason: HOSPADM

## 2024-06-29 RX ORDER — FLUMAZENIL 0.1 MG/ML
0.2 INJECTION, SOLUTION INTRAVENOUS
Status: DISCONTINUED | OUTPATIENT
Start: 2024-06-29 | End: 2024-06-29 | Stop reason: HOSPADM

## 2024-06-29 RX ORDER — CEPHALEXIN 500 MG/1
500 CAPSULE ORAL 4 TIMES DAILY
COMMUNITY
Start: 2024-06-28 | End: 2024-07-11

## 2024-06-29 RX ORDER — ACETAMINOPHEN 325 MG/1
650 TABLET ORAL EVERY 6 HOURS PRN
Status: DISCONTINUED | OUTPATIENT
Start: 2024-06-29 | End: 2024-06-29 | Stop reason: HOSPADM

## 2024-06-29 RX ORDER — CALCIUM CARBONATE 500 MG/1
1000 TABLET, CHEWABLE ORAL 4 TIMES DAILY PRN
Status: DISCONTINUED | OUTPATIENT
Start: 2024-06-29 | End: 2024-06-29 | Stop reason: HOSPADM

## 2024-06-29 RX ORDER — ONDANSETRON 2 MG/ML
4 INJECTION INTRAMUSCULAR; INTRAVENOUS EVERY 6 HOURS PRN
Status: DISCONTINUED | OUTPATIENT
Start: 2024-06-29 | End: 2024-06-29 | Stop reason: HOSPADM

## 2024-06-29 RX ORDER — FOLIC ACID 1 MG/1
1 TABLET ORAL DAILY
Status: DISCONTINUED | OUTPATIENT
Start: 2024-06-29 | End: 2024-06-29 | Stop reason: HOSPADM

## 2024-06-29 RX ADMIN — KETOROLAC TROMETHAMINE 15 MG: 15 INJECTION, SOLUTION INTRAMUSCULAR; INTRAVENOUS at 14:02

## 2024-06-29 RX ADMIN — SODIUM CHLORIDE 1000 ML: 9 INJECTION, SOLUTION INTRAVENOUS at 01:56

## 2024-06-29 RX ADMIN — ONDANSETRON 4 MG: 2 INJECTION INTRAMUSCULAR; INTRAVENOUS at 04:15

## 2024-06-29 RX ADMIN — THIAMINE HCL TAB 100 MG 100 MG: 100 TAB at 13:52

## 2024-06-29 RX ADMIN — Medication 1 TABLET: at 13:51

## 2024-06-29 RX ADMIN — CEFTRIAXONE SODIUM 1 G: 1 INJECTION, POWDER, FOR SOLUTION INTRAMUSCULAR; INTRAVENOUS at 01:56

## 2024-06-29 RX ADMIN — FOLIC ACID 1 MG: 1 TABLET ORAL at 13:51

## 2024-06-29 RX ADMIN — ACETAMINOPHEN 650 MG: 325 TABLET ORAL at 12:25

## 2024-06-29 RX ADMIN — MORPHINE SULFATE 2 MG: 2 INJECTION, SOLUTION INTRAMUSCULAR; INTRAVENOUS at 02:42

## 2024-06-29 RX ADMIN — SODIUM CHLORIDE: 9 INJECTION, SOLUTION INTRAVENOUS at 04:13

## 2024-06-29 ASSESSMENT — ACTIVITIES OF DAILY LIVING (ADL)
ADLS_ACUITY_SCORE: 35
ADLS_ACUITY_SCORE: 35
ADLS_ACUITY_SCORE: 36
ADLS_ACUITY_SCORE: 36
ADLS_ACUITY_SCORE: 35
ADLS_ACUITY_SCORE: 36
ADLS_ACUITY_SCORE: 35
ADLS_ACUITY_SCORE: 36
ADLS_ACUITY_SCORE: 35

## 2024-06-29 ASSESSMENT — COLUMBIA-SUICIDE SEVERITY RATING SCALE - C-SSRS
6. HAVE YOU EVER DONE ANYTHING, STARTED TO DO ANYTHING, OR PREPARED TO DO ANYTHING TO END YOUR LIFE?: NO
1. IN THE PAST MONTH, HAVE YOU WISHED YOU WERE DEAD OR WISHED YOU COULD GO TO SLEEP AND NOT WAKE UP?: NO
2. HAVE YOU ACTUALLY HAD ANY THOUGHTS OF KILLING YOURSELF IN THE PAST MONTH?: NO

## 2024-06-29 NOTE — PROGRESS NOTES
Brief progress note, please review H&P from today for more details.    Morelia is a 39-year-old female with history of alcohol use disorder, current active smoker who was admitted with acute pyelonephritis on the right side, failed outpatient antibiotic therapy.  Currently on IV ceftriaxone, continue.  Follow-up on the urine culture and blood culture results.  Patient placed on CIWA protocol given history of drinking every day.    Oly Almanza MD  Hospitalist

## 2024-06-29 NOTE — PLAN OF CARE
Problem: Adult Inpatient Plan of Care  Goal: Optimal Comfort and Wellbeing  Outcome: Progressing     Problem: Adult Inpatient Plan of Care  Goal: Optimal Comfort and Wellbeing  Intervention: Monitor Pain and Promote Comfort  Recent Flowsheet Documentation  Taken 6/29/2024 1540 by Sami Guadarrama RN  Pain Management Interventions: medication (see MAR)     Problem: Adult Inpatient Plan of Care  Goal: Readiness for Transition of Care  Outcome: Progressing   Goal Outcome Evaluation:    Patient A&Ox4, rested for the majority of the day. Eating between 50-75% of meals. Vitally soft SBPs, but stable on room air. CIWA scores between 1-7. Endorses headache throughout the day; Toradol appeared to help the most. Repositions self independently in bed. Wants to go home. Educated patient on need for IV abx, agreed to stay the night.

## 2024-06-29 NOTE — ED NOTES
[facility] ED Handoff Report    ED Chief Complaint: HA, fever    ED Diagnosis:  (N12) Pyelonephritis  Comment: NA  Plan: IV ABX    (R51.9) Acute nonintractable headache, unspecified headache type  Comment: NA  Plan: pain meds, morphine given    (R52) Body aches  Comment: NA  Plan: Pain mgmt    (R11.0) Nausea  Comment: NA  Plan: Zofran     (R50.9) Fever, unspecified fever cause  Comment: NA  Plan: Monitor, antipyretics       PMH:  No past medical history on file.     Code Status:  Full Code     Falls Risk: No Band: Not applicable    Current Living Situation/Residence: lives with a significant other     Elimination Status: Continent: Yes     Activity Level: Independent    Patients Preferred Language:  English     Needed: No    Vital Signs:  BP 96/55   Pulse 93   Temp 98.7  F (37.1  C) (Oral)   Resp 20   LMP 06/27/2024   SpO2 99%      Cardiac Rhythm: NSR    Pain Score: 6/10    Is the Patient Confused:  No    Last Food or Drink: 6/28/24 at unknown    Focused Assessment:  Fever, HA    Tests Performed: Done: Labs    Treatments Provided:  IV fluids, pain meds    Family Dynamics/Concerns: No    Family Updated On Visitor Policy: No    Plan of Care Communicated to Family: No    Who Was Updated about Plan of Care: Not done/or requested    Belongings Checklist Done and Signed by Patient: No    Belongings Sent with Patient: Clothing    Medications sent with patient: No    Covid: asymptomatic , not done here    Additional Information: TAVO    RN: Garett Liu RN 6/29/2024 3:48 AM

## 2024-06-29 NOTE — PLAN OF CARE
Goal Outcome Evaluation:       Pt A/O X4, denied pain, nausea vomiting, NS running at 100 ml /h. Stand by assist.

## 2024-06-29 NOTE — H&P
[facility]    History and Physical - Hospitalist Service       Date of Admission:  6/29/2024    Assessment & Plan      Morelia Lemon is a 39 year old female with PMH significant for previous episode of pyelonephritis who is admitted on 6/29/2024 with Acute Pyelonephritis .    # Acute pyelonephritis- Admit patient.  CT abdomen/pelvis 6/28/2024 shows right pyelonephritis.  Failed outpatient treatment with Keflex (x 3 doses yesterday).  Continue Rocephin.  Follow-up urine culture.  Repeat labs in AM.  Monitor vital signs closely.    # Fever- 2/2 pyelonephritis.  Continue Rocephin.  Give limited IV hydration. PRN Tylenol.  Monitor vital signs closely.    # Headache- Trial of Toradol.  Give IV hydration.  Supportive measures.    # Alcohol use disorder- Patient reports drinking 6 servings of liquor daily.  However last drink greater than 3 days ago.  Patient counseled on cessation.    # Tobacco use- Smokes 1/2 PPD.  Patient declines nicotine patch.        Diet: Combination Diet Regular Diet Adult  DVT Prophylaxis: Pneumatic Compression Devices  Lentz Catheter: Not present  Lines: None     Cardiac Monitoring: None  Code Status: Full Code per patient.    Clinically Significant Risk Factors Present on Admission                                         Disposition Plan   Anticipate greater than 2 midnight inpatient hospital stay.           Keely Bustamante MD  Hospitalist Service  [facility]  Securely message with [employer] (more info)  Text page via [facility] Paging/Directory     ______________________________________________________________________    Chief Complaint   Fever, lower back pain    History is obtained from the patient, ED physician and chart review.    History of Present Illness   Morelia Lemon is a 39 year old female with PMH significant for previous episode of pyelonephritis who presents to ED due to fever and lower back pain.  Patient reports fever and back pain x 3  days.  She was diagnosed with pyelonephritis yesterday during ED visit at another hospital.  She was reportedly sent home with Keflex per chart review.  Patient says she took 3 doses so far.  Due to persistent lower back pain rated 7/10 she returned to the ED.  Lower back pain now rated 4/10 s/p IV morphine received in the ED. S/p Rocephin.  Patient denies dysuria, hematuria, urinary frequency, chills, nausea, vomiting, abdominal pain, chest pain or shortness of breath.  Patient appears tired, but otherwise in no acute distress.      Past Medical History    Pyelonephritis    Past Surgical History   Past Surgical History:   Procedure Laterality Date    DILATION AND CURETTAGE N/A 11/30/2014    Procedure: DILATION AND CURETTAGE SUCTION;  Surgeon: Janes Apple MD;  Location: [facility];  Service:        Prior to Admission Medications   Prior to Admission Medications   Prescriptions Last Dose Informant Patient Reported? Taking?   Phenylephrine-DM-GG-APAP (TYLENOL COLD/FLU SEVERE PO) 6/29/2024 at 4 tablets @ 0015  Yes Yes   Sig: Take 2 tablets by mouth every 4 hours as needed (for cold/flu symptoms.)   cephALEXin (KEFLEX) 500 MG capsule 6/28/2024 at late evening (3 doses taken on 6/28/24)  Yes Yes   Sig: Take 500 mg by mouth 4 times daily For 14 days.      Facility-Administered Medications: None        Review of Systems    The 10 point Review of Systems is negative other than noted in the HPI.    Social History   I have reviewed this patient's social history and updated it with pertinent information if needed.  Social History     Tobacco Use    Smoking status: Every Day     Current packs/day: 0.50     Types: Cigarettes    Smokeless tobacco: Current     Types: Chew    Tobacco comments:     Pt refused cessation information   Substance Use Topics    Alcohol use: Yes     Comment: Alcoholic Drinks/day: Rare use, only special occasions.    Drug use: No         Family History   Denies    Physical Exam   Vital Signs:  Temp: 98.7  F (37.1  C) Temp src: Oral BP: 96/55 Pulse: 93   Resp: 20 SpO2: 99 % O2 Device: None (Room air)    Weight: 0 lbs 0 oz    General Appearance: AAOx3, NAD  Respiratory: CTAB  Cardiovascular: Regular rate, S1-S2  GI: +BS, soft, nontender, nondistended  : Mild right CVA tenderness  Skin: No rash, multiple tattoos  Other: No pedal edema    Medical Decision Making       50 MINUTES SPENT BY ME on the date of service doing chart review, history, exam, documentation & further activities per the note.      Data     I have personally reviewed the following data over the past 24 hrs:    14.5 (H)  \   12.4   / 244     132 (L) 99 7.3 /  109 (H)   3.6 22 0.66 \     Procal: N/A CRP: N/A Lactic Acid: 0.5 (L)         Imaging results reviewed over the past 24 hrs:   Recent Results (from the past 24 hour(s))   CT Abdomen Pelvis w/o Contrast    Narrative    EXAM: CT ABD PELVIS WO IV CONTRAST STONE  LOCATION: [facility]  DATE: 6/28/2024    INDICATION: Flank pain, right. C/F stone. Patient likely has pyelonephritis.    COMPARISON: Abdominal ultrasound, 7/13/2021.    TECHNIQUE: CT scan of the abdomen and pelvis was performed without oral or intravenous contrast. Multiplanar reformats were obtained. Dose reduction techniques were used.    CONTRAST: None.    FINDINGS:   LOWER CHEST: Normal.    HEPATOBILIARY: Normal.    PANCREAS: Normal.    SPLEEN: Normal.    ADRENAL GLANDS: Normal.    KIDNEY/BLADDER: There is trace right hydronephrosis without urinary tract stone. There is a minimal amount of right perirenal fat stranding.    BOWEL: No bowel obstruction or bowel inflammation. The appendix is normal.    LYMPH NODES: Normal.    VASCULATURE: Normal.    PELVIC ORGANS: Normal.    MUSCULOSKELETAL: Normal.    IMPRESSION: Trace right hydronephrosis without urinary tract stone. There is a minimal amount of fat stranding adjacent to the right kidney. These findings may represent pyelonephritis.

## 2024-06-29 NOTE — ED PROVIDER NOTES
EMERGENCY DEPARTMENT ENCOUNTER      NAME: Morelia Lemon  AGE: 39 year old female  YOB: 1985  MRN: [id number]  EVALUATION DATE & TIME: 6/29/2024  1:17 AM    ED PROVIDER: Mary Ann Griffin MD    Chief Complaint   Patient presents with    Headache    Fever       FINAL IMPRESSION  1. Pyelonephritis    2. Acute nonintractable headache, unspecified headache type    3. Body aches    4. Nausea    5. Fever, unspecified fever cause        MEDICAL DECISION MAKING   Pertinent Labs & Imaging studies reviewed. (See chart for details)    Morelia Lemon is a 39 year old female who presents for evaluation of fever, back pain, body aches.  Outside records reviewed.  Patient was seen in the ED at [facility] yesterday for evaluation of bodyaches, headache, flank pain, and dyspnea that awoke her from sleep earlier that morning.  She has a history of pyelonephritis.  UA was suggestive of infection.  Viral swabs were negative.  Patient did not have any leukocytosis on CBC.  CT abdomen/pelvis showed evidence of fat stranding adjacent to right kidney, possibly representative of pyelonephritis.  She was given ceftriaxone and discharged with a prescription for Keflex.  Today, patient presents to the ED again with complaints of worsening body aches, fever, nausea, back pain.  She reports that she was feeling a bit better when she left [facility] but then spiked a fever when she got home.  She took her Keflex as prescribed and also took a dose of Tylenol but had persistence of symptoms so decided to come back in.  She reports that she has a history of kidney infections but her current symptoms feel worse.    Appears that patient was seen in the ED twice in April 2023 for pyelonephritis.  I reviewed notes from outside hospital.  Most recent culture from 4/27/2023 was negative.     I considered a broad differential including but not limited to urinary tract infection, pyelonephritis, urolithiasis, sexually transmitted disease, electrolyte  derangement, ALLISON, sepsis, other viral illness, muscular strain/sprain, tension headache, migraine. I have lower suspicion for other intra-abdominal/surgical process given history and exam. Patient has no discharge or concerns about sexually transmitted infection.  She did complain of some vague dyspnea but had a negative workup when seen at an outside hospital for the same yesterday.  Overall, do believe that history and exam is consistent with pyelonephritis patient has essentially failed outpatient management.  Discussed options for workup and management with patient. We have agreed on plan for labs, UA, and management of symptoms with IV fluids and IV analgesic/antiemetic as well as empiric antibiotics. Will hold on repeating imaging for now.  Anticipate patient will require admission and she is comfortable with this plan.    CBC with leukocytosis with WBC of 40.5, concerning for infectious/inflammatory process and uptrending as compared to that obtained at outside hospital yesterday.  Fortunately, lactate negative so unlikely sepsis/bacteremia.  BMP reassuring. No evidence of ALLISON, acidosis, or significant electrolyte derangement. Magnesium within normal limits, reassuring.  UA was suggestive of infection with turbid appearance, leukocyte Estrace, bacteria, WBCs.    I rechecked the patient and updated with results.  She had improvement in symptoms with initial interventions and tolerated IV antibiotics without difficulty.  I discussed case with hospitalist who agreed to facilitate admission.          Considerations in Medical Decision Making  History:  Supplemental history from: N/A  External Record(s) reviewed: Documented in chart and Outpatient Record: [facility] ED visit on 6/28/24    Work Up:  Chart documentation includes differential considered and any EKGs or imaging independently interpreted by provider, where specified.  In additional to work up documented, I considered the following work up: Documented in  "chart, if applicable.    External consultation:  Discussion of management with another provider: Documented in chart, if applicable and Hospitalist    Complicating factors:  Care impacted by chronic illness: N/A  Care affected by social determinants of health: Access to care; Access to Medical Care and No Support for Care at Home    Disposition considerations: Admit.      ED COURSE  1:26 AM I met with the patient to gather history and perform my exam. ED course and treatment discussed.   2:36 AM I discussed the patient with Dr. Coulter from the hospitalist service who agrees to admit the patient.      MEDICATIONS GIVEN IN THE ED  Medications   ondansetron (ZOFRAN) injection 4 mg (has no administration in time range)   cefTRIAXone (ROCEPHIN) 1 g vial to attach to  mL bag for ADULTS or NS 50 mL bag for PEDS (0 g Intravenous Stopped 6/29/24 0225)   sodium chloride 0.9% BOLUS 1,000 mL (0 mLs Intravenous Stopped 6/29/24 0225)   morphine (PF) injection 2 mg (2 mg Intravenous $Given 6/29/24 0242)       NEW PRESCRIPTIONS STARTED AT TODAY'S VISIT  New Prescriptions    No medications on file          =================================================================    HPI:    Patient information was obtained from: Patient     Use of : N/A      Morelia Lemon is a 39 year old female who presents with kidney infection    Per chart review: The patient was seen at [facility] emergency department on 6/28/2024 for influenza-like illness.  The patient complained of flank pain, headache, body aches, and shortness of breath.  Patient's temperature was mildly elevated at 99.  Patient tested negative for influenza A, influenza B, and COVID-19.  Patient CT abdomen pelvis showed: \"Trace right hydronephrosis without urinary tract stone. There is a minimal amount of fat stranding adjacent to the right kidney. These findings may represent pyelonephritis\".  Patient discharged with prescription for Keflex to treat " "pyelonephritis.    Patient reports despite taking her prescribed antibiotics, she feels her kidney infection has been worsening.  The patient reports bilateral flank pain since 3 AM yesterday.  The patient reports shortness of breath as well as feeling hot.  Patient states that she is \"just getting hot\" and then will feel as though she \"can't breathe\".  The patient reports 102 fever at home.  Patient reports a history of kidney infections, but states that this feels \"way worse\" than past kidney infections.  The patient reports feeling fine before the symptoms started.  The patient took Tylenol one hour prior to arrival.  The patient denies any sick contacts.  The patient denies any chest pain, nausea, vomiting, or any other symptoms or complaints.    RELEVANT HISTORY, MEDICATIONS, & ALLERGIES   Past medical history, surgical history, family history, medications, and allergies reviewed and pertinent noted in HPI.    REVIEW OF SYSTEMS:  A complete review of systems was performed with pertinent positives and negatives noted in the HPI. All other systems negative.     PHYSICAL EXAM:    Vitals: BP 96/55   Pulse 93   Temp 98.7  F (37.1  C) (Oral)   Resp 20   LMP 06/27/2024   SpO2 99%    General: Alert and interactive, comfortable appearing.  HENT: Atraumatic. Full AROM of neck. MMM.  Cardiovascular: Regular rate and rhythm.   Chest/Pulmonary: Normal work of breathing. Speaking in complete sentences. Lungs CTAB. No chest wall tenderness or deformities.  Abdomen: Soft, nondistended. Nontender without guarding or rebound.  Extremities: Normal AROM of all major joints.  Skin: Warm and dry. Normal skin color.   Neuro: Speech clear. CNs grossly intact. Moves all extremities spontaneously.   Psych: Normal affect/mood, cooperative, memory appropriate.      LAB  Labs Ordered and Resulted from Time of ED Arrival to Time of ED Departure   BASIC METABOLIC PANEL - Abnormal       Result Value    Sodium 132 (*)     Potassium 3.6   "    Chloride 99      Carbon Dioxide (CO2) 22      Anion Gap 11      Urea Nitrogen 7.3      Creatinine 0.66      GFR Estimate >90      Calcium 8.7      Glucose 109 (*)    LACTIC ACID WHOLE BLOOD WITH 1X REPEAT IN 2 HR WHEN >2 - Abnormal    Lactic Acid, Initial 0.5 (*)    CBC WITH PLATELETS - Abnormal    WBC Count 14.5 (*)     RBC Count 4.12      Hemoglobin 12.4      Hematocrit 35.7      MCV 87      MCH 30.1      MCHC 34.7      RDW 12.8      Platelet Count 244     ROUTINE UA WITH MICROSCOPIC REFLEX TO CULTURE - Abnormal    Color Urine Light Yellow      Appearance Urine Turbid (*)     Glucose Urine Negative      Bilirubin Urine Negative      Ketones Urine Trace (*)     Specific Gravity Urine 1.008      Blood Urine 0.5 mg/dL (*)     pH Urine 6.0      Protein Albumin Urine Negative      Urobilinogen Urine <2.0      Nitrite Urine Negative      Leukocyte Esterase Urine 250 Hanna/uL (*)     Bacteria Urine Moderate (*)     Budding Yeast Urine Few (*)     RBC Urine 3 (*)     WBC Urine 16 (*)     Squamous Epithelials Urine 13 (*)    MAGNESIUM - Normal    Magnesium 1.8     BASIC METABOLIC PANEL   URINE CULTURE       RADIOLOGY  No orders to display           I, Lena Childers, am serving as a scribe to document services personally performed by Dr. Mary Ann Griffin based on my observation and the provider's statements to me. I, Mary Ann Griffin MD attest that Lena Childers is acting in a scribe capacity, has observed my performance of the services and has documented them in accordance with my direction.    Mary Ann Griffin M.D.  Emergency Medicine  [facility] EMERGENCY DEPARTMENT  [facility] [facility] [address] [phone]  Dept: [phone]     Mary Ann Griffin MD  06/29/24 0359

## 2024-06-29 NOTE — MEDICATION SCRIBE - ADMISSION MEDICATION HISTORY
Medication Scribe Admission Medication History    Admission medication history is complete. The information provided in this note is only as accurate as the sources available at the time of the update.    Information Source(s): Patient and CareEverywhere/SureScripts via in-person    Pertinent Information: pt manages their own medications.  Cephalexin  -pt started a 14 day regimen of 500mg QID on 6/28/24. Pt took 3 dose on 6/28/24, with 3rd dose being taken in the late evening.  Tylenol Cold and Flu  -pt states she took 4 tablets at just after midnight. Per caplet: 325mg tylenol, 200mg guaifenesin, 5mg  phenylephrine, dextromethorphan 10mg.  -pt had box of them with her in purse    Changes made to PTA medication list:  Added: Cephalexin 500mg, Tylenol Cold and Flu Severe,  Deleted: Escitalopram, Naproxen  Changed: None    Allergies reviewed with patient and updates made in EHR: yes    Medication History Completed By: Mack Payne 6/29/2024 1:52 AM    PTA Med List   Medication Sig Last Dose    cephALEXin (KEFLEX) 500 MG capsule Take 500 mg by mouth 4 times daily For 14 days. 6/28/2024 at late evening (3 doses taken on 6/28/24)    Phenylephrine-DM-GG-APAP (TYLENOL COLD/FLU SEVERE PO) Take 2 tablets by mouth every 4 hours as needed (for cold/flu symptoms.) 6/29/2024 at 4 tablets @ 0015

## 2024-06-29 NOTE — ED TRIAGE NOTES
The patient has had abdominal pain and fever since 0300 yesterday. She was taken  to [facility]. She was found to have a kidney infection. She was prescribed antibiotics. Her fever has returned and she now has a headache. She also reports a fever of 102F. She took tylenol at 0015.

## 2024-06-30 LAB — BACTERIA UR CULT: NO GROWTH
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
## 2024-07-02 ENCOUNTER — PATIENT OUTREACH (OUTPATIENT)
Dept: CARE COORDINATION | Facility: CLINIC | Age: 39
End: 2024-07-02
Payer: MEDICAID

## 2024-07-02 NOTE — PROGRESS NOTES
Connected Care Resource Center Contact  [facility]/Voicemail     Clinical Data: Post-Discharge Outreach     Outreach attempted x 2.  Unable to leave voicemail as inbox is full.     Plan:  [facility] will do no further outreaches at this time.       CHELSEA Low  [facility] Resource [place], [facility]    *Connected Care Resource Team does NOT follow patient ongoing. Referrals are identified based on internal discharge reports and the outreach is to ensure patient has an understanding of their discharge instructions.

## 2025-06-17 ENCOUNTER — HOSPITAL ENCOUNTER (EMERGENCY)
Facility: HOSPITAL | Age: 40
Discharge: HOME | End: 2025-06-17
Payer: COMMERCIAL

## 2025-06-17 VITALS
OXYGEN SATURATION: 97 % | SYSTOLIC BLOOD PRESSURE: 111 MMHG | HEART RATE: 85 BPM | DIASTOLIC BLOOD PRESSURE: 72 MMHG | RESPIRATION RATE: 10 BRPM

## 2025-06-17 DIAGNOSIS — R41.82 ALTERED MENTAL STATUS, UNSPECIFIED ALTERED MENTAL STATUS TYPE: ICD-10-CM

## 2025-06-17 DIAGNOSIS — F10.920 ALCOHOLIC INTOXICATION WITHOUT COMPLICATION: ICD-10-CM

## 2025-06-17 DIAGNOSIS — F12.90 MARIJUANA USE: ICD-10-CM

## 2025-06-17 LAB
ANION GAP SERPL CALCULATED.3IONS-SCNC: 9 MMOL/L (ref 7–15)
BASOPHILS # BLD AUTO: 0.1 10E3/UL (ref 0–0.2)
BASOPHILS NFR BLD AUTO: 1 %
BUN SERPL-MCNC: 9.7 MG/DL (ref 6–20)
CALCIUM SERPL-MCNC: 9 MG/DL (ref 8.8–10.4)
CHLORIDE SERPL-SCNC: 108 MMOL/L (ref 98–107)
CREAT SERPL-MCNC: 0.71 MG/DL (ref 0.51–0.95)
EGFRCR SERPLBLD CKD-EPI 2021: >90 ML/MIN/1.73M2
EOSINOPHIL # BLD AUTO: 0.1 10E3/UL (ref 0–0.7)
EOSINOPHIL NFR BLD AUTO: 2 %
ERYTHROCYTE [DISTWIDTH] IN BLOOD BY AUTOMATED COUNT: 13.2 % (ref 10–15)
ETHANOL SERPL-MCNC: 0.17 G/DL
GLUCOSE SERPL-MCNC: 103 MG/DL (ref 70–99)
HCO3 SERPL-SCNC: 24 MMOL/L (ref 22–29)
HCT VFR BLD AUTO: 35.4 % (ref 35–47)
HGB BLD-MCNC: 11.6 G/DL (ref 11.7–15.7)
IMM GRANULOCYTES # BLD: 0 10E3/UL
IMM GRANULOCYTES NFR BLD: 0 %
LYMPHOCYTES # BLD AUTO: 1.8 10E3/UL (ref 0.8–5.3)
LYMPHOCYTES NFR BLD AUTO: 36 %
MCH RBC QN AUTO: 28.6 PG (ref 26.5–33)
MCHC RBC AUTO-ENTMCNC: 32.8 G/DL (ref 31.5–36.5)
MCV RBC AUTO: 87 FL (ref 78–100)
MONOCYTES # BLD AUTO: 0.2 10E3/UL (ref 0–1.3)
MONOCYTES NFR BLD AUTO: 4 %
NEUTROPHILS # BLD AUTO: 2.9 10E3/UL (ref 1.6–8.3)
NEUTROPHILS NFR BLD AUTO: 57 %
NRBC # BLD AUTO: 0 10E3/UL
NRBC BLD AUTO-RTO: 0 /100
PLATELET # BLD AUTO: 323 10E3/UL (ref 150–450)
POTASSIUM SERPL-SCNC: 3.5 MMOL/L (ref 3.4–5.3)
RBC # BLD AUTO: 4.06 10E6/UL (ref 3.8–5.2)
SODIUM SERPL-SCNC: 141 MMOL/L (ref 135–145)
WBC # BLD AUTO: 5.1 10E3/UL (ref 4–11)

## 2025-06-17 PROCEDURE — 80048 BASIC METABOLIC PNL TOTAL CA: CPT

## 2025-06-17 PROCEDURE — 36415 COLL VENOUS BLD VENIPUNCTURE: CPT

## 2025-06-17 PROCEDURE — 82077 ASSAY SPEC XCP UR&BREATH IA: CPT

## 2025-06-17 PROCEDURE — 99283 EMERGENCY DEPT VISIT LOW MDM: CPT

## 2025-06-17 PROCEDURE — 85025 COMPLETE CBC W/AUTO DIFF WBC: CPT

## 2025-06-17 ASSESSMENT — ACTIVITIES OF DAILY LIVING (ADL): ADLS_ACUITY_SCORE: 55

## 2025-06-18 LAB
HOLD SPECIMEN: NORMAL
HOLD SPECIMEN: NORMAL
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
## 2025-06-18 NOTE — ED NOTES
Pt awake, alert, and wanting to leave. A family member has arrived and will drive her home. ED MD notified.

## 2025-06-18 NOTE — ED PROVIDER NOTES
"EMERGENCY DEPARTMENT ENCOUNTER      NAME: Morelia Lemon  AGE: 39 year old female  YOB: 1985  MRN: [id number]  EVALUATION DATE & TIME: 6/17/2025 10:44 PM    PCP: Jose Glover    ED PROVIDER: James Christina MD    FINAL IMPRESSION:  1. Alcoholic intoxication without complication    2. Marijuana use    3. Altered mental status, unspecified altered mental status type        ED COURSE & MEDICAL DECISION MAKING:    Pertinent Labs & Imaging studies reviewed. (See chart for details)  39 year old female presents to the Emergency Department for evaluation of unresponsive in the triage.  Differential diagnosis considered Alcohol intoxication, alcohol withdrawal, electrolyte disturbance, hepatic encephalopathy, hypertensive encephalopathy, hypoglycemia, hyperglycemia, opioid overdose, uremia, traumatic blood loss, toxic ingestion, brain tumor, thyrotoxicosis, sepsis, polypharmacy, psychiatric disturbance, seizure, ischemic stroke, hemorrhagic stroke.     Triage Note: Presents to ER through waiting room carried by jairoienfco with  accompanying them. Patient is unresponsive upon arrival. Sternal rub elicits a response but she does not answer this writer. Maintaining her airway, vitally stable and O2 saturations in upper 90s. Boyfriend states they were at a park in [facility] and passing a marijuana joint around. She took a turn and \"minutes\" later she went \"limp\". He was [place] when [place] for erratic driving.  escorted them to the ER. Patient roomed and provider called to the room.            ED Course as of 06/18/25 0012   Tue Jun 17, 2025   2240 Met patient and performed my initial exam   2255 Patient emergently roomed due to unresponsive episode.  Patient was smoking marijuana as well as drinking alcohol and after smoking marijuana had an unresponsive episode.  No loss of bowel or bladder seizure-like activity.  No with sternal rub patient is able " to localize pain.  She is able to verbalize and move all extremities spontaneously.  She is not hypercapnic.  No pinpoint pupils.  No sign of trauma.  No reported history of trauma.  Will observe the patient and obtain basic labs.  He does not believe she requires Narcan has a low suspicion for opioids   2317 I met patient and performed my initial exam.    2323 Ethanol Level Blood(!)  Elevated alcohol however is clinically sober.  No slurred speech is now awake alert talking.  Able to walk a straight line.  Requesting to be discharged.  Believe this is reasonable.  She has a safe at home and her mother is at bedside and feels comfortable taking the patient home.   2323 Patient states she wants to leave.        Not Applicable    I discussed the plan for discharge with the patient and patient is agreeable. We discussed supportive cares at home and reasons to return to the ER including new or worsening symptoms. All questions and concerns addressed to the best of my ability. Strict return precautions discussed. Patient to be discharge by RN.    At the conclusion of the encounter I discussed the results of the tests and the disposition. The questions were answered. The patient or family acknowledged understanding and was agreeable with the care plan.     MEDICATIONS GIVEN IN THE EMERGENCY:  Medications - No data to display    NEW PRESCRIPTIONS STARTED AT TODAY'S ER VISIT  Discharge Medication List as of 6/17/2025 11:27 PM        Discharge Medication List as of 6/17/2025 11:27 PM          =================================================================    HPI    Morelia Lemon is a 39 year old female with a pertinent history of anxiety, depression, and methamphetamine abuse who presents to this ED for evaluation of being unresponsive in triage.     Use of : N/A    Per chart review, patient was admitted to [facility] ED on 06/29/24 for worsening body aches, fever, nausea, and back pain. Final impression  "states: 1. Pyelonephritis 2. Acute nonintractable headache, unspecified headache type 3. Body aches  4. Nausea  5. Fever, unspecified fever cause.     Per nursing staff, patient was carried in by her boyfriend who was then taken by a .  told nursing staff he found them driving erratically. After patient's bf dropped her off in triage, he was taken by the  for questioning, then arrested. Nursing staff reports, patient and her boyfriend was at [facility] where she smoke Marijuana, and possibly took some drugs/ substances. Patient's boyfriend was intoxicated as well. No known seizures.     When asked, patient reports she smoke some weed and drank alcohol at the park today. Denies any fentanyl or drug use. When asked why she presents to the ED, patient responded,  \"I don't know why I'm here\". No other concerns at this time.     PHYSICAL EXAM    /72   Pulse 85   Resp 10   SpO2 97%   Constitutional: Responding to sternal rub eyes closed however will not let the arm hit her face when lifted above.  Head: Normocephalic, atraumatic, mucous membranes moist, nose normal.   Neck: Supple, gross ROM intact.   Eyes: Eyes closed.   Respiratory: Clear to auscultation bilaterally, no respiratory distress, no wheezing, speaks full sentences easily.  Cardiovascular: Normal heart rate, regular rhythm, no murmurs. No lower extremity edema. well localized sternum rub  GI: Soft, no tenderness to deep palpation in all quadrants.  Musculoskeletal: Moving all 4 extremities intentionally and without pain. No obvious deformity.  Skin: Warm, dry, no rash.  Neurologic: moving all extremities spontaneously        LAB:  All pertinent labs reviewed and interpreted.  Results for orders placed or performed during the hospital encounter of 06/17/25   Basic metabolic panel   Result Value Ref Range    Sodium 141 135 - 145 mmol/L    Potassium 3.5 3.4 - 5.3 mmol/L    Chloride 108 (H) 98 - 107 mmol/L "    Carbon Dioxide (CO2) 24 22 - 29 mmol/L    Anion Gap 9 7 - 15 mmol/L    Urea Nitrogen 9.7 6.0 - 20.0 mg/dL    Creatinine 0.71 0.51 - 0.95 mg/dL    GFR Estimate >90 >60 mL/min/1.73m2    Calcium 9.0 8.8 - 10.4 mg/dL    Glucose 103 (H) 70 - 99 mg/dL   Result Value Ref Range    Ethanol Level Blood 0.17 (H) <=0.01 g/dL   CBC with platelets and differential   Result Value Ref Range    WBC Count 5.1 4.0 - 11.0 10e3/uL    RBC Count 4.06 3.80 - 5.20 10e6/uL    Hemoglobin 11.6 (L) 11.7 - 15.7 g/dL    Hematocrit 35.4 35.0 - 47.0 %    MCV 87 78 - 100 fL    MCH 28.6 26.5 - 33.0 pg    MCHC 32.8 31.5 - 36.5 g/dL    RDW 13.2 10.0 - 15.0 %    Platelet Count 323 150 - 450 10e3/uL    % Neutrophils 57 %    % Lymphocytes 36 %    % Monocytes 4 %    % Eosinophils 2 %    % Basophils 1 %    % Immature Granulocytes 0 %    NRBCs per 100 WBC 0 <1 /100    Absolute Neutrophils 2.9 1.6 - 8.3 10e3/uL    Absolute Lymphocytes 1.8 0.8 - 5.3 10e3/uL    Absolute Monocytes 0.2 0.0 - 1.3 10e3/uL    Absolute Eosinophils 0.1 0.0 - 0.7 10e3/uL    Absolute Basophils 0.1 0.0 - 0.2 10e3/uL    Absolute Immature Granulocytes 0.0 <=0.4 10e3/uL    Absolute NRBCs 0.0 10e3/uL       RADIOLOGY:  Reviewed all pertinent imaging. Please see official radiology report.  No orders to display         James Christina MD  [facility] EMERGENCY DEPARTMENT  [address] [id number] [phone]   =================================================================    BILLING:  Data  Category 1  Non-ED record review, if applicable. External record reviewed: Reviewed past admission to the hospital  patient was admitted to [facility] ED on 06/29/24 for worsening body aches, fever, nausea, and back pain.     Clinical information was obtained from an independent historian. History was obtained from: Patient and Mother provided additional information in the HPI     The following testing was considered but ultimately not selected after  discussion with patient/family: Head CT given altered mental status however no focal neurologic deficits and no reported history of trauma.  External exam did not show evidence of trauma     Category 2  My independent interpretation of EKG, rhythm strip, radiology study: N/A     Category 3  Discussion of management with other physician/healthcare provider/other source: N/A       Risk  Prescription medication was considered, but ultimately not given after discussion with patient/family: N/A     Chronic conditions affecting care: anxiety, depression, and methamphetamine abuse     Consideration of Admission/Observation: Escalation of care including admission/observation was considered given the complexity and risk of the patient's presenting complaint, exam findings, and/or their underlying comorbidities. However, ultimately I feel the patient is safe for outpatient management with close follow up. Reasoning: Work-up reassuring, does not reveal any acute life/organ threatening processes, patient's symptoms well controlled upon reevaluation, reexamination is reassuring, vitals are stable, patient agreeable with discharge, reliable for follow-up.   I considered admission however patient dramatic improvement in her altered mental status is awake alert talking moving all extremities.  Is safer at home as above.  Request to be discharged.  Will discharge home.          I, Wanda Muñoz, am serving as a scribe to document services personally performed by James Christina MD based on my observation and the provider's statements to me. I, James Christina MD, attest that Wanda Muñoz is acting in a scribe capacity, has observed my performance of the services and has documented them in accordance with my direction.     James Christina MD  06/18/25 0012

## 2025-06-18 NOTE — ED NOTES
Pt presents for evaluation of altered mental status after smoking weed. Carried in by boyfriend who drove here intoxicated and was arrested upon arrival to ER. Pt endorses smoking weed and drinking alocohol.   SKIN: WNL.   HEENT: Normocephalic, eyes closed, minimally responsive to pain. Pt able to stand out of wheelchair with much encouragement. Minimally cooperative.   CHEST: Symmetrical rise. No reported CP or SOB.  ABD: No reported N&V or diarrhea.  EXT: MARIE x 4  Rest of exam unremarkable.